# Patient Record
(demographics unavailable — no encounter records)

---

## 2024-10-07 NOTE — ASSESSMENT
[FreeTextEntry1] : 65 year old female presenting for evaluation.  #Cancer associated pain: -Side effects to opiate medication -Continue medical cannabis prn -I Stop # [id number]  #Compression fracture/Neuropathic pain: -S/p Neurosurgery evaluation -TLSO -Continue gabapentin 200mg BID-Rx sent  Close follow up in 6-8 weeks.

## 2024-10-07 NOTE — DATA REVIEWED
[FreeTextEntry1] : 7/3/24- Xray mild compression deformities of T7 and T8.  MRI thoracic spine without contrast 7/19/24- chronic compression fracture above T6. Abnormal marrow signal in T8 vertebral body which could represent a stress fracture. Bulging at T6-7. Cervical disc abnormalities at C4-5, C5-6.  PET/CT 7/24/24- severe compression deformity at T7 with increased uptake, SUV 6.2. Healing rib fracture at the anteromedial right third rib, SUV 3.5. 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

## 2024-10-07 NOTE — PHYSICAL EXAM
[FreeTextEntry1] : Gen: Patient is A&O x 3, NAD HEENT: EOMI, hearing grossly normal Resp: regular, non - labored CV: pulses regular Skin: no rashes, erythema Lymph: no clubbing, cyanosis, edema Inspection: no instability  ROM: full throughout Palpation: No TTP thoracic spine Sensation: intact to light touch Reflexes: 1+ and symmetric throughout Strength: 5/5 throughout Special tests: -Hoffmans sign  Gait: normal, non-antalgic

## 2024-10-07 NOTE — HISTORY OF PRESENT ILLNESS
[FreeTextEntry1] : Ms. Ya is a 65 year old female who was found to have multiple compression fractures, work up with IgG kappa multiple myeloma along with iron deficiency anemia.  History of colonic AVM.  Treatment with Velcade, Revlimid, Dexamethasone Started on 8/9/24.  She reports that she is doing significantly better since last visit.  Started gabapentin and states that this is helping with her pain no major side effects.  Occasionally using medical marijuana for days where she has more pain especially in her back region.  No new weakness or bowel bladder dysfunction.  No side effects current treatment.

## 2024-10-20 NOTE — REVIEW OF SYSTEMS
[Fever] : no fever [Chills] : no chills [Night Sweats] : no night sweats [Shortness Of Breath] : no shortness of breath [SOB on Exertion] : no shortness of breath during exertion [Abdominal Pain] : no abdominal pain [Vomiting] : no vomiting [FreeTextEntry3] : eye redness [FreeTextEntry5] : from ribs - occasional right flank- better [FreeTextEntry9] : Back pain from compression fractures- better [de-identified] : headaches

## 2024-10-20 NOTE — HISTORY OF PRESENT ILLNESS
[de-identified] : IgG kappa multiple myeloma Iron deficiency anemia/colonic AVM  ARI BARRIENTOS is a 65 y.o. 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   Patient reports ~ 6 month hx of intermittent right flank rib pains.  Would notice after certain movements, then would seem to resolve.   Then on 6/29/24 she was playing on the floor with her puppy and developed acute severe upper back pains.  7/2/24 - Saw PCP Dr. Gastelum - Was felt to have muscle spasms but was sent for labs and an x-ray.   WBC: 12.59, Hgb: 7.7, Hct: 31.2, MCV: 73.2, Plts: 500 Ferritin: 4, TSAT: 5% SPEP: M-spike 1.7 SIFE: IgG Kappa monoclonal band present Serum light chains: Kappa: 592, Lambda: 11.2, K/L FLC Ratio: 50.63 No QIG's available, No CMP.   Daughter reports had spine X-ray ([facility]) that showed that showed a T7 and T8 compression fracture. (Daughter is a physical therapist and somewhat familiar with these medical terms). Patient has been taking Percocet, but it is barely just getting the edge off.  She has an appointment with a spine doctor later today.   Patient recall being told she had AVM's about 4 years ago.  Had anemia - was sent to GI for an evaluation (Dr. Mian Jaimes) and she reports she had a colonoscopy where AVM's were seen and cauterized.   States she has had a few subsequent episodes of anemia - would be sent back to GI and have another procedure.  Then she would be instructed to take PO iron for a short period of time after and her anemia would resolve.  She has never had a blood transfusion or IV iron. She denies a personal or family hx of bleeding or clotting disorders.  She reports she just saw Dr. Villanueva 7/9/24 - was instructed to start on PO iron, folic acid, and Vitamin C. Per daughter it sounds like he was considering a procedure but wanted to work around every else.  Patient does admit to chronic fatigue. She reports she has had pica for ice for a very long time.  She denies any breathing issues but her daughter feels she has SOB and wheezing on occasion.    MRI thoracic spine without contrast 7/19/24- chronic compression fracture above T6.  Abnormal marrow signal in T8 vertebral body which could represent a stress fracture.  Bulging at T6-7.  Cervical disc abnormalities at C4-5, C5-6.  PET/CT 7/24/24- severe compression deformity at T7 with increased uptake, SUV 6.2.  Healing rib fracture at the anteromedial right third rib, SUV 3.5.  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  Bone marrow biopsy 7/17/24- plasma cell myeloma, 18% by aspirate.  Trilineage hematopoiesis, no iron stores.  IHC  >10% but fragmented marrow.  Cytogenetics- 51,X,-X,add(2)(p11.2),+3,+5,+5,idic(6)(q21),+7,+9,-13,+15,+19,add(20)(q13.1)[6]/46,XX[14] ABNORMAL FISH MULTIPLE MYELOMA PANEL - RB1 deletion detected (11%)  [de-identified] : She is here with her daughter.  She is feeling much improved.  C3D15 of velcade today. Overall she is feeling well.  Has some erythema of or eyes, seen by the eye doctor, no vision issues.  Also with headaches.  Attributes both to possibly the revlimid.   Pain is minimal. Awaiting to complete her dental work next week.

## 2024-10-20 NOTE — REVIEW OF SYSTEMS
[Fever] : no fever [Chills] : no chills [Night Sweats] : no night sweats [Shortness Of Breath] : no shortness of breath [SOB on Exertion] : no shortness of breath during exertion [Abdominal Pain] : no abdominal pain [Vomiting] : no vomiting [FreeTextEntry3] : eye redness [FreeTextEntry5] : from ribs - occasional right flank- better [FreeTextEntry9] : Back pain from compression fractures- better [de-identified] : headaches

## 2024-10-20 NOTE — HISTORY OF PRESENT ILLNESS
[de-identified] : IgG kappa multiple myeloma Iron deficiency anemia/colonic AVM  ARI BARRIENTOS is a 65 y.o. 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   Patient reports ~ 6 month hx of intermittent right flank rib pains.  Would notice after certain movements, then would seem to resolve.   Then on 6/29/24 she was playing on the floor with her puppy and developed acute severe upper back pains.  7/2/24 - Saw PCP Dr. Gastelum - Was felt to have muscle spasms but was sent for labs and an x-ray.   WBC: 12.59, Hgb: 7.7, Hct: 31.2, MCV: 73.2, Plts: 500 Ferritin: 4, TSAT: 5% SPEP: M-spike 1.7 SIFE: IgG Kappa monoclonal band present Serum light chains: Kappa: 592, Lambda: 11.2, K/L FLC Ratio: 50.63 No QIG's available, No CMP.   Daughter reports had spine X-ray ([facility]) that showed that showed a T7 and T8 compression fracture. (Daughter is a physical therapist and somewhat familiar with these medical terms). Patient has been taking Percocet, but it is barely just getting the edge off.  She has an appointment with a spine doctor later today.   Patient recall being told she had AVM's about 4 years ago.  Had anemia - was sent to GI for an evaluation (Dr. Mian Jaimes) and she reports she had a colonoscopy where AVM's were seen and cauterized.   States she has had a few subsequent episodes of anemia - would be sent back to GI and have another procedure.  Then she would be instructed to take PO iron for a short period of time after and her anemia would resolve.  She has never had a blood transfusion or IV iron. She denies a personal or family hx of bleeding or clotting disorders.  She reports she just saw Dr. Villanueva 7/9/24 - was instructed to start on PO iron, folic acid, and Vitamin C. Per daughter it sounds like he was considering a procedure but wanted to work around every else.  Patient does admit to chronic fatigue. She reports she has had pica for ice for a very long time.  She denies any breathing issues but her daughter feels she has SOB and wheezing on occasion.    MRI thoracic spine without contrast 7/19/24- chronic compression fracture above T6.  Abnormal marrow signal in T8 vertebral body which could represent a stress fracture.  Bulging at T6-7.  Cervical disc abnormalities at C4-5, C5-6.  PET/CT 7/24/24- severe compression deformity at T7 with increased uptake, SUV 6.2.  Healing rib fracture at the anteromedial right third rib, SUV 3.5.  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  Bone marrow biopsy 7/17/24- plasma cell myeloma, 18% by aspirate.  Trilineage hematopoiesis, no iron stores.  IHC  >10% but fragmented marrow.  Cytogenetics- 51,X,-X,add(2)(p11.2),+3,+5,+5,idic(6)(q21),+7,+9,-13,+15,+19,add(20)(q13.1)[6]/46,XX[14] ABNORMAL FISH MULTIPLE MYELOMA PANEL - RB1 deletion detected (11%)  [de-identified] : She is here with her daughter.  She is feeling much improved.  C3D15 of velcade today. Overall she is feeling well.  Has some erythema of or eyes, seen by the eye doctor, no vision issues.  Also with headaches.  Attributes both to possibly the revlimid.   Pain is minimal. Awaiting to complete her dental work next week.

## 2024-10-20 NOTE — ASSESSMENT
[FreeTextEntry1] : Multiple myeloma. ISS-1.  Standard risk.   This is a 65 year old female who was found to have multiple compression fractures, work up with IgG kappa multiple myeloma along with iron deficiency anemia.   7/3/24- Xray mild compression deformities of T7 and T8.   Labs on 7/12/24- Hg 7.7 Creatinine 0.88, calcium 9.2 total protein/albumin 8.5/4.0 SPEP- M spike 2.1, IgG 2789, kappa FLC 54.96/lambda FLC 0.85, ratio 64.66 7/5- Iron 29/sat 5/TIBC 528/ferritin 4  Iron deficiency/history of colonic AVM-  Given 4 doses of feraheme in July. Her Hg has since normalized- Hg 14.8 today.  Work up has confirmed multiple myeloma. ISS-1.  Standard risk.  MRI thoracic spine without contrast 7/19/24- chronic compression fracture above T6. Abnormal marrow signal in T8 vertebral body which could represent a stress fracture. Bulging at T6-7. Cervical disc abnormalities at C4-5, C5-6.  PET/CT 7/24/24- severe compression deformity at T7 with increased uptake, SUV 6.2. Healing rib fracture at the anteromedial right third rib, SUV 3.5. 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  Bone marrow biopsy 7/17/24- plasma cell myeloma, 18% by aspirate. Trilineage hematopoiesis, no iron stores. IHC  >10% but fragmented marrow. Cytogenetics- 51,X,-X,add(2)(p11.2),+3,+5,+5,idic(6)(q21),+7,+9,-13,+15,+19,add(20)(q13.1)[6]/46,XX[14] ABNORMAL FISH MULTIPLE MYELOMA PANEL - RB1 deletion detected (11%).  Started on RVD, bortezomib, lenalidomide, and dexamethasone (VRd). Velcade 1.3mg/m2 SQ weekly x 3, 1 week off.  Revlimid 25mg/day, days 1-21 every 28 days. Dexamethasone 20mg weekly.  Started on 8/9/24.  Currently cycle 3, day 15.   7/12/24- M spike 2.1, IgG 2789, k/l ratio 64.66 9/19/24- M spike 0.3, IgG 654, k/l ratio 1.35 Repeat today.   Dental work planned, hold on xgeva.  Transplant evaluation- planned next week.   Thromboprophylaxis with aspirin daily (taking 325mg).   Antiviral prophylaxis with acyclovir 400mg twice a day.  Zofran as needed for nausea. Bowel regimen for constipation.   Continued pain control with gabapentin/tramadol as needed.   She will follow up in 4 weeks.

## 2024-10-24 NOTE — REASON FOR VISIT
[Other: _____] : [unfilled] [Initial Consultation] : an initial consultation for [FreeTextEntry2] : Multiple myeloma/JUAN LUIS

## 2024-10-24 NOTE — ASSESSMENT
[FreeTextEntry1] : Patient is a 65 year old female diagnosed with IgG kappa multiple myeloma along with iron deficiency anemia.  If Multiple Myeloma:  Type: IgG Kappa  symptomatic vs asymptomatic ISS stage: 1  R-ISS stage: [place] Stage:  Cytogenetic risk:  Patients history, pathology, imaging, and plan was reviewed.  Patient has completed 3 cycles of RVD, with improvement in the m-spike. At this point, the patient appears to have achieved a Disease response, as evidence by PET vs % reduction in M spike vs % reduction in free light chains. This is a BMT Indications for Transplant indication for an autologous stem cell transplant based on the ASTCT practice guidelines. The patient is an excellent candidate for transplant and has great social support. We spoke to the patient at length regarding their disease, treatment options, and the role of autologous HSCT in the treatment of their disease and the expected morbidity and mortality as well as impact on disease-free, overall survival. We discussed stem cell mobilization with filgrastim and plerixafor, tunneled plasmapheresis catheter placement, stem cell collection via apheresis, conditioning chemotherapy Autologous BMT Conditioning Regimen, autologous stem cell transplant hospital admission, expected hospital course, side effects of treatment, discharge medication, post-transplant outpatient follows-up and precautions. The patient verbalized understanding and their questions have been answered.  We encouraged the patient to take the time to read our transplant book. They will be referred to our , for a psycho/social evaluation prior to transplant. They will receive for further education at a later time. We have referred the patient for dental evaluation and clearance.  We recommend completing cycle 6 of RVD and restaging with PET/CT vs bone marrow biopsy . The patient verbalized understanding of plan. We will reach out to their primary hematologist with our recommendations. The patient was encouraged to contact us with any questions or concerns.  Plan to proceed with pretesting.  Stem cell mobilization/collection the week of 11/18/24 Tentative plan to admit for transplant the week of 12/9/24.   Follow-up with Dr. Foss in   Faviola Foss MD [facility]

## 2024-10-24 NOTE — HISTORY OF PRESENT ILLNESS
[de-identified] : IgG kappa multiple myeloma Iron deficiency anemia/colonic AVM  ARI BARRIENTOS is a 65 y.o. 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   Patient reports ~ 6 month hx of intermittent right flank rib pains.  Would notice after certain movements, then would seem to resolve.   Then on 6/29/24 she was playing on the floor with her puppy and developed acute severe upper back pains.  7/2/24 - Saw PCP Dr. Gastelum - Was felt to have muscle spasms but was sent for labs and an x-ray.   WBC: 12.59, Hgb: 7.7, Hct: 31.2, MCV: 73.2, Plts: 500 Ferritin: 4, TSAT: 5% SPEP: M-spike 1.7 SIFE: IgG Kappa monoclonal band present Serum light chains: Kappa: 592, Lambda: 11.2, K/L FLC Ratio: 50.63 No QIG's available, No CMP.   Daughter reports had spine X-ray ([facility]) that showed that showed a T7 and T8 compression fracture. (Daughter is a physical therapist and somewhat familiar with these medical terms). Patient has been taking Percocet, but it is barely just getting the edge off.  She has an appointment with a spine doctor later today.   Patient recall being told she had AVM's about 4 years ago.  Had anemia - was sent to GI for an evaluation (Dr. Mian Jaimes) and she reports she had a colonoscopy where AVM's were seen and cauterized.   States she has had a few subsequent episodes of anemia - would be sent back to GI and have another procedure.  Then she would be instructed to take PO iron for a short period of time after and her anemia would resolve.  She has never had a blood transfusion or IV iron. She denies a personal or family hx of bleeding or clotting disorders.  She reports she just saw Dr. Villanueva 7/9/24 - was instructed to start on PO iron, folic acid, and Vitamin C. Per daughter it sounds like he was considering a procedure but wanted to work around every else.  Patient does admit to chronic fatigue. She reports she has had pica for ice for a very long time.  She denies any breathing issues but her daughter feels she has SOB and wheezing on occasion.    MRI thoracic spine without contrast 7/19/24- chronic compression fracture above T6.  Abnormal marrow signal in T8 vertebral body which could represent a stress fracture.  Bulging at T6-7.  Cervical disc abnormalities at C4-5, C5-6.  PET/CT 7/24/24- severe compression deformity at T7 with increased uptake, SUV 6.2.  Healing rib fracture at the anteromedial right third rib, SUV 3.5.  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  Bone marrow biopsy 7/17/24- plasma cell myeloma, 18% by aspirate.  Trilineage hematopoiesis, no iron stores.  IHC  >10% but fragmented marrow.  Cytogenetics- 51,X,-X,add(2)(p11.2),+3,+5,+5,idic(6)(q21),+7,+9,-13,+15,+19,add(20)(q13.1)[6]/46,XX[14] ABNORMAL FISH MULTIPLE MYELOMA PANEL - RB1 deletion detected (11%)   Patient is a 65 year old female, who presents to discuss the role of an autologous HSCT in the management of their multiple myeloma. I had the pleasure of seeing the patient in consultation, who was referred by Dr oGetz. Patient is accompanied by daughter.   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   ---------Oncologic Hx:------------  Multiple Myeloma   ---------Interval Hx:----------------   ---------Treatment Hx------------- Status post 3 cycles of RVD, bortezomib, lenalidomide, and dexamethasone (VRd). Radiation treatment? specificy location, time and dates.   ---------Pathology Hx------------- Initial Pre-treatment Biopsy (of what): Date ---Morphology: ---IHC stain: ---FISH: ---Karyotype: ---NGS:     XXXXXXX  Biopsy (of what): Date ---Morphology: ---IHC stain: ---FISH: ---Karyotype: ---NGS:    ---------Imaging Hx---------------- (if applicable)   ---------Social:----------------------- -The patient was born in *** and currently lives in ***. - Patient lives with her daughter and daughter's fiance. - Patient has ___ siblings. 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  -  XXsmoker, XX alcohol use, XX other drug use   --------------------Surgical Hx: ------------------------     --------------------Past Family Hx:----------------------                       [de-identified] : 10.24.24: Patient is here for a initial consult for an Autologous Stem Cell Transplant.

## 2024-10-24 NOTE — ADDENDUM
[FreeTextEntry1] : Documented by Karina Avendano acting as a scribe for Dr. Nory Foss on 10/24/24. All medical record entries made by the Scribe were at my, Dr. Nory Foss, direction and personally dictated by me on 10/24/24. I have reviewed the chart and agree that the record accurately reflects my personal performance of the history, physical exam, assessment and plan. I have also personally directed, reviewed, and agree with the discharge instructions.

## 2024-11-14 NOTE — PHYSICAL EXAM
[Obese] : obese [Normal] : affect appropriate [de-identified] : anicteric [de-identified] : no c/c/e [de-identified] : no rashes

## 2024-11-14 NOTE — REVIEW OF SYSTEMS
[Patient Intake Form Reviewed] : Patient intake form was reviewed [Fever] : no fever [Chills] : no chills [Night Sweats] : no night sweats [Fatigue] : fatigue [Chest Pain] : chest pain [Shortness Of Breath] : no shortness of breath [SOB on Exertion] : no shortness of breath during exertion [Abdominal Pain] : no abdominal pain [Vomiting] : no vomiting [Constipation] : constipation [Diarrhea: Grade 0] : Diarrhea: Grade 0 [Muscle Pain] : muscle pain [Anxiety] : anxiety [Negative] : Heme/Lymph [FreeTextEntry3] : eye redness [FreeTextEntry5] : from ribs - occasional right flank- better [FreeTextEntry9] : Back pain from compression fractures- better [de-identified] : headaches

## 2024-11-14 NOTE — HISTORY OF PRESENT ILLNESS
[de-identified] : IgG kappa multiple myeloma Iron deficiency anemia/colonic AVM  ARI BARRIENTOS is a 65 y.o. 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   Patient reports ~ 6 month hx of intermittent right flank rib pains.  Would notice after certain movements, then would seem to resolve.   Then on 6/29/24 she was playing on the floor with her puppy and developed acute severe upper back pains.  7/2/24 - Saw PCP Dr. Gastelum - Was felt to have muscle spasms but was sent for labs and an x-ray.   WBC: 12.59, Hgb: 7.7, Hct: 31.2, MCV: 73.2, Plts: 500 Ferritin: 4, TSAT: 5% SPEP: M-spike 1.7 SIFE: IgG Kappa monoclonal band present Serum light chains: Kappa: 592, Lambda: 11.2, K/L FLC Ratio: 50.63 No QIG's available, No CMP.   Daughter reports had spine X-ray ([facility]) that showed that showed a T7 and T8 compression fracture. (Daughter is a physical therapist and somewhat familiar with these medical terms). Patient has been taking Percocet, but it is barely just getting the edge off.  She has an appointment with a spine doctor later today.   Patient recall being told she had AVM's about 4 years ago.  Had anemia - was sent to GI for an evaluation (Dr. Mian Jaimes) and she reports she had a colonoscopy where AVM's were seen and cauterized.   States she has had a few subsequent episodes of anemia - would be sent back to GI and have another procedure.  Then she would be instructed to take PO iron for a short period of time after and her anemia would resolve.  She has never had a blood transfusion or IV iron. She denies a personal or family hx of bleeding or clotting disorders.  She reports she just saw Dr. Villanueva 7/9/24 - was instructed to start on PO iron, folic acid, and Vitamin C. Per daughter it sounds like he was considering a procedure but wanted to work around every else.  Patient does admit to chronic fatigue. She reports she has had pica for ice for a very long time.  She denies any breathing issues but her daughter feels she has SOB and wheezing on occasion.    MRI thoracic spine without contrast 7/19/24- chronic compression fracture above T6.  Abnormal marrow signal in T8 vertebral body which could represent a stress fracture.  Bulging at T6-7.  Cervical disc abnormalities at C4-5, C5-6.  PET/CT 7/24/24- severe compression deformity at T7 with increased uptake, SUV 6.2.  Healing rib fracture at the anteromedial right third rib, SUV 3.5.  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  Bone marrow biopsy 7/17/24- plasma cell myeloma, 18% by aspirate.  Trilineage hematopoiesis, no iron stores.  IHC  >10% but fragmented marrow.  Cytogenetics- 51,X,-X,add(2)(p11.2),+3,+5,+5,idic(6)(q21),+7,+9,-13,+15,+19,add(20)(q13.1)[6]/46,XX[14] ABNORMAL FISH MULTIPLE MYELOMA PANEL - RB1 deletion detected (11%)  [de-identified] : She is here with her daughter.  She is feeling much improved.  C3D15 of velcade today. Overall she is feeling well.  Has some erythema of or eyes, seen by the eye doctor, no vision issues.  Also with headaches.  Attributes both to possibly the revlimid.   Pain is minimal. Awaiting to complete her dental work next week.

## 2024-11-14 NOTE — REASON FOR VISIT
[Follow-Up Visit] : a follow-up visit for [Other: _____] : [unfilled] [FreeTextEntry2] : Multiple myeloma/JUAN LUIS

## 2024-11-19 NOTE — PROCEDURE
[Bone Marrow Biopsy] : bone marrow biopsy [Bone Marrow Aspiration] : bone marrow aspiration  [Patient] : the patient [Verbal Consent Obtained] : verbal consent was obtained prior to the procedure [Patient identification verified] : patient identification verified [Procedure verified and consent obtained] : procedure verified and consent obtained [Laterality verified and correct site marked] : laterality verified and correct site marked [Right] : site: right [Correct positioning] : correct positioning [Prone] : prone [Superior iliac spine was identified] : the superior iliac spine was identified. [The right posterior iliac crest was prepped with betadine and draped, using sterile technique.] : The right posterior iliac crest was prepped with betadine and draped, using sterile technique. [Lidocaine was injected and into the periosteum overlying the site.] : Lidocaine was injected and into the periosteum overlying the site. [Aspirate] : aspirate [Cytogenetics] : cytogenetics [FISH] : FISH [Biopsy] : biopsy [Flow Cytometry] : flow cytometry [] : The patient was instructed to remove the bandage the following AM. The patient may bathe. Acetaminophen may be taken for discomfort, as per package directions.If there are any other problems, the patient was instructed to call the office. The patient verbalized understanding, and is aware of the office contact numbers. [FreeTextEntry1] : Multiple Myeloma pre transplant [FreeTextEntry2] : 6 cc of lidocaine was used for the procedure.   WBC: 6.10 Hgb: 14.4 Hct: 43.3 Plts: 141  Bone marrow aspiration and biopsy were done. Multiple Myeloma panel requested.

## 2024-11-19 NOTE — REASON FOR VISIT
[Bone Marrow Biopsy] : bone marrow biopsy [Bone Marrow Aspiration] : bone marrow aspiration [FreeTextEntry2] : Multiple Myeloma pre transplant

## 2024-11-22 NOTE — HISTORY OF PRESENT ILLNESS
[90: Able to carry normal activity; minor signs or symptoms of disease.] : 90: Able to carry normal activity; minor signs or symptoms of disease.  [de-identified] : IgG kappa multiple myeloma Iron deficiency anemia/colonic AVM  ARI BARRIENTOS is a 65 y.o. 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  PRIOR HISTORY Patient reports ~ 6 month hx of intermittent right flank rib pains.  Would notice after certain movements, then would seem to resolve.   Then on 6/29/24 she was playing on the floor with her puppy and developed acute severe upper back pains.  7/2/24 - Saw PCP Dr. Gastelum - Was felt to have muscle spasms but was sent for labs and an x-ray.    upon dx WBC: 12.59, Hgb: 7.7, Hct: 31.2, MCV: 73.2, Plts: 500 Ferritin: 4, TSAT: 5% SPEP: M-spike 1.7 SIFE: IgG Kappa monoclonal band present Serum light chains: Kappa: 592, Lambda: 11.2, K/L FLC Ratio: 50.63 No QIG's available, No CMP.   Daughter reports had spine X-ray ([facility]) that showed that showed a T7 and T8 compression fracture. (Daughter is a physical therapist and somewhat familiar with these medical terms).   Patient recall being told she had AVM's about 4 years ago.  Had anemia - was sent to GI for an evaluation (Dr. Mian Jaimes) and she reports she had a colonoscopy where AVM's were seen and cauterized.   States she has had a few subsequent episodes of anemia - would be sent back to GI and have another procedure.  Then she would be instructed to take PO iron for a short period of time after and her anemia would resolve.  She has never had a blood transfusion or IV iron. She denies a personal or family hx of bleeding or clotting disorders.  She reports she just saw Dr. Villanueva 7/9/24 - was instructed to start on PO iron, folic acid, and Vitamin C. Per daughter it sounds like he was considering a procedure but wanted to work around every else.  Patient does admit to chronic fatigue. She reports she has had pica for ice for a very long time.  She denies any breathing issues but her daughter feels she has SOB and wheezing on occasion.    MRI thoracic spine without contrast 7/19/24- chronic compression fracture above T6.  Abnormal marrow signal in T8 vertebral body which could represent a stress fracture.  Bulging at T6-7.  Cervical disc abnormalities at C4-5, C5-6.  PET/CT 7/24/24- severe compression deformity at T7 with increased uptake, SUV 6.2.  Healing rib fracture at the anteromedial right third rib, SUV 3.5.  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  Bone marrow biopsy 7/17/24- plasma cell myeloma, 18% by aspirate.  Trilineage hematopoiesis, no iron stores.  IHC  >10% but fragmented marrow.  Cytogenetics- 51,X,-X,add(2)(p11.2),+3,+5,+5,idic(6)(q21),+7,+9,-13,+15,+19,add(20)(q13.1)[6]/46,XX[14] ABNORMAL FISH MULTIPLE MYELOMA PANEL - RB1 deletion detected (11%)   Patient is a 65 year old female, who presents to discuss the role of an autologous HSCT in the management of their multiple myeloma. I had the pleasure of seeing the patient in consultation, who was referred by Dr Goetz. Patient is accompanied by daughter.   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   ---------Oncologic Hx:------------  Multiple Myeloma IgG kappa   ---------Interval Hx:----------------   ---------Treatment Hx------------- Status post 3 cycles of RVD, bortezomib, lenalidomide, and dexamethasone (VRd). Radiation treatment no specificy location, time and dates.   ---------Pathology Hx------------- Initial Pre-treatment Biopsy BM: Date 7/17/24 ---Morphology: above ---IHC stain: ---FISH: rb1 del ---Karyotype: complex as above ---NGS:     XXXXXXX  Biopsy (of what): Date ---Morphology: ---IHC stain: ---FISH: ---Karyotype: ---NGS:    ---------Imaging Hx---------------- above   ---------Social:----------------------- -The patient was born on [place] and currently lives here - Patient lives with her daughter and daughter's fiance. - Patient has older siblings. 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  -  no smoker, no alcohol use, no other drug use   --------------------Surgical Hx: ------------------------     --------------------Past Family Hx:----------------------                       [de-identified] : 11/19/24: Patient is here for a follow up visit to discuss autologous stem cell transplant. Completed cycle 4 RVD. Bone marrow biopsy today. Overall, well and offers no acute concerns.

## 2024-11-22 NOTE — HISTORY OF PRESENT ILLNESS
[90: Able to carry normal activity; minor signs or symptoms of disease.] : 90: Able to carry normal activity; minor signs or symptoms of disease.  [de-identified] : IgG kappa multiple myeloma Iron deficiency anemia/colonic AVM  ARI BARRIENTOS is a 65 y.o. 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  PRIOR HISTORY Patient reports ~ 6 month hx of intermittent right flank rib pains.  Would notice after certain movements, then would seem to resolve.   Then on 6/29/24 she was playing on the floor with her puppy and developed acute severe upper back pains.  7/2/24 - Saw PCP Dr. Gastelum - Was felt to have muscle spasms but was sent for labs and an x-ray.    upon dx WBC: 12.59, Hgb: 7.7, Hct: 31.2, MCV: 73.2, Plts: 500 Ferritin: 4, TSAT: 5% SPEP: M-spike 1.7 SIFE: IgG Kappa monoclonal band present Serum light chains: Kappa: 592, Lambda: 11.2, K/L FLC Ratio: 50.63 No QIG's available, No CMP.   Daughter reports had spine X-ray ([facility]) that showed that showed a T7 and T8 compression fracture. (Daughter is a physical therapist and somewhat familiar with these medical terms).   Patient recall being told she had AVM's about 4 years ago.  Had anemia - was sent to GI for an evaluation (Dr. Mian Jaimes) and she reports she had a colonoscopy where AVM's were seen and cauterized.   States she has had a few subsequent episodes of anemia - would be sent back to GI and have another procedure.  Then she would be instructed to take PO iron for a short period of time after and her anemia would resolve.  She has never had a blood transfusion or IV iron. She denies a personal or family hx of bleeding or clotting disorders.  She reports she just saw Dr. Villanueva 7/9/24 - was instructed to start on PO iron, folic acid, and Vitamin C. Per daughter it sounds like he was considering a procedure but wanted to work around every else.  Patient does admit to chronic fatigue. She reports she has had pica for ice for a very long time.  She denies any breathing issues but her daughter feels she has SOB and wheezing on occasion.    MRI thoracic spine without contrast 7/19/24- chronic compression fracture above T6.  Abnormal marrow signal in T8 vertebral body which could represent a stress fracture.  Bulging at T6-7.  Cervical disc abnormalities at C4-5, C5-6.  PET/CT 7/24/24- severe compression deformity at T7 with increased uptake, SUV 6.2.  Healing rib fracture at the anteromedial right third rib, SUV 3.5.  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  Bone marrow biopsy 7/17/24- plasma cell myeloma, 18% by aspirate.  Trilineage hematopoiesis, no iron stores.  IHC  >10% but fragmented marrow.  Cytogenetics- 51,X,-X,add(2)(p11.2),+3,+5,+5,idic(6)(q21),+7,+9,-13,+15,+19,add(20)(q13.1)[6]/46,XX[14] ABNORMAL FISH MULTIPLE MYELOMA PANEL - RB1 deletion detected (11%)   Patient is a 65 year old female, who presents to discuss the role of an autologous HSCT in the management of their multiple myeloma. I had the pleasure of seeing the patient in consultation, who was referred by Dr Goetz. Patient is accompanied by daughter.   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   ---------Oncologic Hx:------------  Multiple Myeloma IgG kappa   ---------Interval Hx:----------------   ---------Treatment Hx------------- Status post 3 cycles of RVD, bortezomib, lenalidomide, and dexamethasone (VRd). Radiation treatment no specificy location, time and dates.   ---------Pathology Hx------------- Initial Pre-treatment Biopsy BM: Date 7/17/24 ---Morphology: above ---IHC stain: ---FISH: rb1 del ---Karyotype: complex as above ---NGS:     XXXXXXX  Biopsy (of what): Date ---Morphology: ---IHC stain: ---FISH: ---Karyotype: ---NGS:    ---------Imaging Hx---------------- above   ---------Social:----------------------- -The patient was born on [place] and currently lives here - Patient lives with her daughter and daughter's fiance. - Patient has older siblings. 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  -  no smoker, no alcohol use, no other drug use   --------------------Surgical Hx: ------------------------     --------------------Past Family Hx:----------------------                       [de-identified] : 11/19/24: Patient is here for a follow up visit to discuss autologous stem cell transplant. Completed cycle 4 RVD. Bone marrow biopsy today. Overall, well and offers no acute concerns.

## 2024-11-22 NOTE — ASSESSMENT
[FreeTextEntry1] : Patient is a 65 year old female diagnosed with IgG kappa multiple myeloma along with iron deficiency anemia, CAD, colonic avm, htn, hld here for a follow up visit to discuss autologous stem cell transplantation.   If Multiple Myeloma:  Type: IgG Kappa  symptomatic  ISS stage: 1  R-ISS stage: Durie Waverly Stage:  Cytogenetic risk: high  Patient's history, pathology, imaging, and plan was reviewed.  Patient has completed 4 cycles of RVD, with improvement in the m-spike. At this point, the patient appears to have achieved a Disease response, as evidence by reduction in M spike. This is a BMT Indication for an autologous stem cell transplant based on the ASTCT practice guidelines. The patient is an excellent candidate for transplant and has great social support. We spoke to the patient at length regarding their disease, treatment options, and the role of autologous HSCT in the treatment of their disease and the expected morbidity and mortality as well as impact on disease-free, overall survival. We discussed stem cell mobilization with filgrastim and plerixafor, tunneled plasmapheresis catheter placement, stem cell collection via apheresis, conditioning chemotherapy Autologous BMT Conditioning Regimen, autologous stem cell transplant hospital admission, expected hospital course, side effects of treatment, discharge medication, post-transplant outpatient follows-up and precautions. The patient verbalized understanding and their questions have been answered.  We encouraged the patient to take the time to read our transplant book. They will be referred to our , for a psycho/social evaluation prior to transplant. They will receive for further education at a later time. We have referred the patient for dental evaluation and clearance.  We recommended completing cycle 4 of RVD and restaging with bone marrow biopsy. Bone marrow biopsy completed on 11/19/24.  The patient verbalized understanding of plan. We will reach out to their primary hematologist with our recommendations. The patient was encouraged to contact us with any questions or concerns. pt has indeterminant guanteferon gold...to see ID also hx of MI will ask for cardiology clearance HEB b core antibody and surface antibody positive...help b PCR pending Pretesting on 11/15/24. .  Stem cell mobilization/collection will begin on 11/29/24. Growth factor teaching on 11/29/24. Shiley catheter will be scheduled on 12/2/24.  Plan to begin stem cell collection on 12/3/24.  Admit for transplant on 12/26/24 at [facility]. or week prior d/w Dr Goetz Restart blood pressure medication.  Follow up on 11/29/24 for growth factor teaching. Follow-up with Dr. Foss in 3 weeks  Faviola Foss MD [facility] Adult Transplantation and Cellular Therapy Program

## 2024-11-22 NOTE — ASSESSMENT
[FreeTextEntry1] : Patient is a 65 year old female diagnosed with IgG kappa multiple myeloma along with iron deficiency anemia, CAD, colonic avm, htn, hld here for a follow up visit to discuss autologous stem cell transplantation.   If Multiple Myeloma:  Type: IgG Kappa  symptomatic  ISS stage: 1  R-ISS stage: Durie Breda Stage:  Cytogenetic risk: high  Patient's history, pathology, imaging, and plan was reviewed.  Patient has completed 4 cycles of RVD, with improvement in the m-spike. At this point, the patient appears to have achieved a Disease response, as evidence by reduction in M spike. This is a BMT Indication for an autologous stem cell transplant based on the ASTCT practice guidelines. The patient is an excellent candidate for transplant and has great social support. We spoke to the patient at length regarding their disease, treatment options, and the role of autologous HSCT in the treatment of their disease and the expected morbidity and mortality as well as impact on disease-free, overall survival. We discussed stem cell mobilization with filgrastim and plerixafor, tunneled plasmapheresis catheter placement, stem cell collection via apheresis, conditioning chemotherapy Autologous BMT Conditioning Regimen, autologous stem cell transplant hospital admission, expected hospital course, side effects of treatment, discharge medication, post-transplant outpatient follows-up and precautions. The patient verbalized understanding and their questions have been answered.  We encouraged the patient to take the time to read our transplant book. They will be referred to our , for a psycho/social evaluation prior to transplant. They will receive for further education at a later time. We have referred the patient for dental evaluation and clearance.  We recommended completing cycle 4 of RVD and restaging with bone marrow biopsy. Bone marrow biopsy completed on 11/19/24.  The patient verbalized understanding of plan. We will reach out to their primary hematologist with our recommendations. The patient was encouraged to contact us with any questions or concerns. pt has indeterminant guanteferon gold...to see ID also hx of MI will ask for cardiology clearance HEB b core antibody and surface antibody positive...help b PCR pending Pretesting on 11/15/24. .  Stem cell mobilization/collection will begin on 11/29/24. Growth factor teaching on 11/29/24. Shiley catheter will be scheduled on 12/2/24.  Plan to begin stem cell collection on 12/3/24.  Admit for transplant on 12/26/24 at [facility]. or week prior d/w Dr Goetz Restart blood pressure medication.  Follow up on 11/29/24 for growth factor teaching. Follow-up with Dr. Foss in 3 weeks  Faviola Foss MD [facility] Adult Transplantation and Cellular Therapy Program

## 2024-12-04 NOTE — PHYSICAL EXAM
[General Appearance - Alert] : alert [General Appearance - In No Acute Distress] : in no acute distress [Sclera] : the sclera and conjunctiva were normal [Outer Ear] : the ears and nose were normal in appearance [Oropharynx] : the oropharynx was normal with no thrush [Neck Appearance] : the appearance of the neck was normal [Neck Cervical Mass (___cm)] : no neck mass was observed [Jugular Venous Distention Increased] : there was no jugular-venous distention [Auscultation Breath Sounds / Voice Sounds] : lungs were clear to auscultation bilaterally [Heart Rate And Rhythm] : heart rate was normal and rhythm regular [Heart Sounds] : normal S1 and S2 [Heart Sounds Gallop] : no gallops [Murmurs] : no murmurs [Heart Sounds Pericardial Friction Rub] : no pericardial rub [Full Pulse] : the pedal pulses are present [Edema] : there was no peripheral edema [Bowel Sounds] : normal bowel sounds [Abdomen Soft] : soft [Abdomen Tenderness] : non-tender [Abdomen Mass (___ Cm)] : no abdominal mass palpated [No Palpable Adenopathy] : no palpable adenopathy [Musculoskeletal - Swelling] : no joint swelling [Nail Clubbing] : no clubbing  or cyanosis of the fingernails [Motor Tone] : muscle strength and tone were normal [Skin Color & Pigmentation] : normal skin color and pigmentation [] : no rash [Affect] : the affect was normal

## 2024-12-04 NOTE — HISTORY OF PRESENT ILLNESS
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  Patient denies any chills fevers or night sweats.  Patient denies any night sweats.  Patient denies any cough or shortness of breath.  Denies any nausea vomiting diarrhea. [] : No [de-identified] : [place] [de-identified] : Travel only to the [place] [de-identified] : Lives permanently [place].  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 No other unusual hobbies. No livestock exposure.  [de-identified] : Urosepsis ~1 year ago. No history of recurrent UTI Sinusitis x1 No Episodes of PNA COVID19 x1 in 2020

## 2024-12-04 NOTE — ASSESSMENT
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   COVID19 Nucleocapsid Antibody PENDING COVID19 Jerry Antibody PENDING HAV IgG PENDING HBVs Ab Negative HBVsAg Negative HBVc Ab Positive HCV Ab PENDING HSV 1 IgG Positive HSV 2 IgG Negative EBV IgG Positive CMV IgG PENDING VZV IgG Positive Measles IgG Positive Mumps IgG PENDING Rubella IgG Positive Quantiferon Gold Indeterminate, TSPOT TB Negative TSPOT TB Negative  HIV Ag/Ab by [facility]IA PENDING Syphilis Screen PENDING Toxoplasma IgG PENDING Strongyloides Ab Negative  PET CT (7/24/24) No adenopathy, nodules or pulmonary infiltrates  #Prestem cell transplant evaluation -QuantiFERON indeterminate however T-SPOT.TB was negative and PET scan without worrisome infiltrates, nodules or adenopathy.  This would not service contraindication for transplant -ID clearance for stem cell transplant pending HIV testing, syphilis testing -Will check HIV antigen antibody, mumps IgG, toxoplasma IgG, hepatitis A IgG, CMV IgG, syphilis screen, Coccidioides antibody  #Encounter to Vaccinate Patient COVID19: Would benefit from COVID19 6241-9254 Vaccine Dose Influenza: Will require RSV: Will require Pneumococcal: Would benefit from PCV20 HAV: Check HAV IgG HBV: Immune, would not require further vaccination MMR: Immune, would not require further vaccination Varicella: Immune, would not require further vaccination Shingles: Will require Shingrix Tdap: Will require Tdap

## 2024-12-04 NOTE — HISTORY OF PRESENT ILLNESS
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  Patient denies any chills fevers or night sweats.  Patient denies any night sweats.  Patient denies any cough or shortness of breath.  Denies any nausea vomiting diarrhea. [] : No [de-identified] : [place] [de-identified] : Travel only to the [place] [de-identified] : Lives permanently [place].  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 No other unusual hobbies. No livestock exposure.  [de-identified] : Urosepsis ~1 year ago. No history of recurrent UTI Sinusitis x1 No Episodes of PNA COVID19 x1 in 2020

## 2024-12-04 NOTE — ASSESSMENT
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   COVID19 Nucleocapsid Antibody PENDING COVID19 Jerry Antibody PENDING HAV IgG PENDING HBVs Ab Negative HBVsAg Negative HBVc Ab Positive HCV Ab PENDING HSV 1 IgG Positive HSV 2 IgG Negative EBV IgG Positive CMV IgG PENDING VZV IgG Positive Measles IgG Positive Mumps IgG PENDING Rubella IgG Positive Quantiferon Gold Indeterminate, TSPOT TB Negative TSPOT TB Negative  HIV Ag/Ab by [facility]IA PENDING Syphilis Screen PENDING Toxoplasma IgG PENDING Strongyloides Ab Negative  PET CT (7/24/24) No adenopathy, nodules or pulmonary infiltrates  #Prestem cell transplant evaluation -QuantiFERON indeterminate however T-SPOT.TB was negative and PET scan without worrisome infiltrates, nodules or adenopathy.  This would not service contraindication for transplant -ID clearance for stem cell transplant pending HIV testing, syphilis testing -Will check HIV antigen antibody, mumps IgG, toxoplasma IgG, hepatitis A IgG, CMV IgG, syphilis screen, Coccidioides antibody  #Encounter to Vaccinate Patient COVID19: Would benefit from COVID19 [id number] Vaccine Dose Influenza: Will require RSV: Will require Pneumococcal: Would benefit from PCV20 HAV: Check HAV IgG HBV: Immune, would not require further vaccination MMR: Immune, would not require further vaccination Varicella: Immune, would not require further vaccination Shingles: Will require Shingrix Tdap: Will require Tdap

## 2024-12-12 NOTE — ASSESSMENT
[FreeTextEntry1] : Multiple myeloma. ISS-1.  Standard risk.   This is a 65 year old female who was found to have multiple compression fractures, work up with IgG kappa multiple myeloma along with iron deficiency anemia.   7/3/24- Xray mild compression deformities of T7 and T8.   Labs on 7/12/24- Hg 7.7 Creatinine 0.88, calcium 9.2 total protein/albumin 8.5/4.0 SPEP- M spike 2.1, IgG 2789, kappa FLC 54.96/lambda FLC 0.85, ratio 64.66 7/5- Iron 29/sat 5/TIBC 528/ferritin 4  Iron deficiency/history of colonic AVM-  Given 4 doses of feraheme in July. Her Hg has since normalized.  Work up has confirmed multiple myeloma. ISS-1.  Standard risk.  MRI thoracic spine without contrast 7/19/24- chronic compression fracture above T6. Abnormal marrow signal in T8 vertebral body which could represent a stress fracture. Bulging at T6-7. Cervical disc abnormalities at C4-5, C5-6.  PET/CT 7/24/24- severe compression deformity at T7 with increased uptake, SUV 6.2. Healing rib fracture at the anteromedial right third rib, SUV 3.5. 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  Bone marrow biopsy 7/17/24- plasma cell myeloma, 18% by aspirate. Trilineage hematopoiesis, no iron stores. IHC  >10% but fragmented marrow. Cytogenetics- 51,X,-X,add(2)(p11.2),+3,+5,+5,idic(6)(q21),+7,+9,-13,+15,+19,add(20)(q13.1)[6]/46,XX[14] ABNORMAL FISH MULTIPLE MYELOMA PANEL - RB1 deletion detected (11%).  Started on RVD, bortezomib, lenalidomide, and dexamethasone (VRd). Velcade 1.3mg/m2 SQ weekly x 3, 1 week off.  Revlimid 25mg/day, days 1-21 every 28 days. Dexamethasone 20mg weekly.  Started on 8/9/24.  Currently cycle 4, day 15.   7/12/24- M spike 2.1, IgG 2789, k/l ratio 64.66 9/19/24- M spike 0.3, IgG 654, k/l ratio 1.35 10/17/24- M spike 0.1, IgG 590, k/l ratio 1.45 Repeat today.   Thromboprophylaxis with aspirin daily (taking 325mg).   Antiviral prophylaxis with acyclovir 400mg twice a day.  Zofran as needed for nausea. Bowel regimen for constipation.  Continued pain control with gabapentin/tramadol as needed.  Dental work planned, hold on xgeva.  In VGPR- PSCT planned- bone marrow biopsy at [facility] next week.  Moblization of stem cells planned for 11/25.   She will follow up post transplant.

## 2024-12-12 NOTE — PHYSICAL EXAM
[Obese] : obese [Normal] : affect appropriate [de-identified] : anicteric [de-identified] : no c/c/e [de-identified] : no rashes

## 2024-12-12 NOTE — HISTORY OF PRESENT ILLNESS
[de-identified] : IgG kappa multiple myeloma Iron deficiency anemia/colonic AVM  ARI BARRIENTOS is a 65 y.o. 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   Patient reports ~ 6 month hx of intermittent right flank rib pains.  Would notice after certain movements, then would seem to resolve.   Then on 6/29/24 she was playing on the floor with her puppy and developed acute severe upper back pains.  7/2/24 - Saw PCP Dr. Gastelum - Was felt to have muscle spasms but was sent for labs and an x-ray.   WBC: 12.59, Hgb: 7.7, Hct: 31.2, MCV: 73.2, Plts: 500 Ferritin: 4, TSAT: 5% SPEP: M-spike 1.7 SIFE: IgG Kappa monoclonal band present Serum light chains: Kappa: 592, Lambda: 11.2, K/L FLC Ratio: 50.63 No QIG's available, No CMP.   Daughter reports had spine X-ray ([facility]) that showed that showed a T7 and T8 compression fracture. (Daughter is a physical therapist and somewhat familiar with these medical terms). Patient has been taking Percocet, but it is barely just getting the edge off.  She has an appointment with a spine doctor later today.   Patient recall being told she had AVM's about 4 years ago.  Had anemia - was sent to GI for an evaluation (Dr. Mian Jaimes) and she reports she had a colonoscopy where AVM's were seen and cauterized.   States she has had a few subsequent episodes of anemia - would be sent back to GI and have another procedure.  Then she would be instructed to take PO iron for a short period of time after and her anemia would resolve.  She has never had a blood transfusion or IV iron. She denies a personal or family hx of bleeding or clotting disorders.  She reports she just saw Dr. Villanueva 7/9/24 - was instructed to start on PO iron, folic acid, and Vitamin C. Per daughter it sounds like he was considering a procedure but wanted to work around every else.  Patient does admit to chronic fatigue. She reports she has had pica for ice for a very long time.  She denies any breathing issues but her daughter feels she has SOB and wheezing on occasion.    MRI thoracic spine without contrast 7/19/24- chronic compression fracture above T6.  Abnormal marrow signal in T8 vertebral body which could represent a stress fracture.  Bulging at T6-7.  Cervical disc abnormalities at C4-5, C5-6.  PET/CT 7/24/24- severe compression deformity at T7 with increased uptake, SUV 6.2.  Healing rib fracture at the anteromedial right third rib, SUV 3.5.  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  Bone marrow biopsy 7/17/24- plasma cell myeloma, 18% by aspirate.  Trilineage hematopoiesis, no iron stores.  IHC  >10% but fragmented marrow.  Cytogenetics- 51,X,-X,add(2)(p11.2),+3,+5,+5,idic(6)(q21),+7,+9,-13,+15,+19,add(20)(q13.1)[6]/46,XX[14] ABNORMAL FISH MULTIPLE MYELOMA PANEL - RB1 deletion detected (11%)  [de-identified] : She had her stem cell mobilized, planned for admission on 12/23 or 12/26 for auto PSCT.  Still has some occasional back discomfort, not taking any pain medications.

## 2024-12-12 NOTE — REVIEW OF SYSTEMS
[Patient Intake Form Reviewed] : Patient intake form was reviewed [Fatigue] : fatigue [Chest Pain] : chest pain [Constipation] : constipation [Diarrhea: Grade 0] : Diarrhea: Grade 0 [Muscle Pain] : muscle pain [Anxiety] : anxiety [Negative] : Heme/Lymph [Fever] : no fever [Chills] : no chills [Night Sweats] : no night sweats [Shortness Of Breath] : no shortness of breath [SOB on Exertion] : no shortness of breath during exertion [Abdominal Pain] : no abdominal pain [Vomiting] : no vomiting [FreeTextEntry3] : eye redness [FreeTextEntry5] : from ribs - occasional right flank- better [FreeTextEntry9] : Back pain from compression fractures- better [de-identified] : headaches

## 2024-12-17 NOTE — ASSESSMENT
[FreeTextEntry1] : Patient is a 65 year old female diagnosed with IgG kappa multiple myeloma along with iron deficiency anemia, CAD, colonic avm, htn, hld here for a follow up visit to discuss autologous stem cell transplantation.  s/p stem cell mobilization and collection with good yield Multiple Myeloma:  Type: IgG Kappa  symptomatic  ISS stage: 1  R-ISS stage: Durie Clawson Stage:  Cytogenetic risk: high  Patient's history, pathology, imaging, and plan was reviewed.  Patient has completed 4 cycles of RVD, with improvement in the m-spike. At this point, the patient appears to have achieved at lease vgpr  Disease response, as evidence by reduction in M spike. This is a BMT Indication for an autologous stem cell transplant based on the ASTCT practice guidelines. The patient is an excellent candidate for transplant and has great social support. We spoke to the patient at length regarding their disease, treatment options, and the role of autologous HSCT in the treatment of their disease and the expected morbidity and mortality as well as impact on disease-free, overall survival. We discussed stem cell mobilization with filgrastim and plerixafor, tunneled plasmapheresis catheter placement, stem cell collection via apheresis, conditioning chemotherapy Autologous BMT Conditioning Regimen, autologous stem cell transplant hospital admission, expected hospital course, side effects of treatment, discharge medication, post-transplant outpatient follows-up and precautions. The patient verbalized understanding and their questions have been answered. Renetta 200  We encouraged the patient to take the time to read our transplant book. They will be referred to our , for a psycho/social evaluation prior to transplant. They will receive for further education at a later time. We have referred the patient for dental evaluation and clearance.  We recommended completing cycle 4 of RVD and restaging with bone marrow biopsy. Bone marrow biopsy completed on 11/19/24 w/o morphologic evidence of plasma cells.  The patient verbalized understanding of plan. We will reach out to their primary hematologist with our recommendations. The patient was encouraged to contact us with any questions or concerns. pt has indeterminant guanteferon gold...to see ID..cleared to proceed also hx of MI will ask for cardiology clearance..done HEB b core antibody and surface antibody positive...help b PCR pending Pretesting on 11/15/24. .  Stem cell mobilization/collection will begin on 11/29/24. Growth factor teaching on 11/29/24. Shiley catheter will be scheduled on 12/2/24.  Plan to begin stem cell collection on 12/3/24.  Admit for transplant on 12/26/24 at [facility]. Infusion 12/27 d/w Dr Goetz Restart blood pressure medication.  cath care 12/24 Follow-up with Dr. Foss in 3 weeks after d/c DRI intermediate hct ci 1  Faviola Foss MD [facility] Adult Transplantation and Cellular Therapy Program

## 2024-12-17 NOTE — PHYSICAL EXAM
[Restricted in physically strenuous activity but ambulatory and able to carry out work of a light or sedentary nature] : Status 1- Restricted in physically strenuous activity but ambulatory and able to carry out work of a light or sedentary nature, e.g., light house work, office work [Normal] : affect appropriate [de-identified] : no active dental infection

## 2024-12-17 NOTE — HISTORY OF PRESENT ILLNESS
[90: Able to carry normal activity; minor signs or symptoms of disease.] : 90: Able to carry normal activity; minor signs or symptoms of disease.  [de-identified] : IgG kappa multiple myeloma Iron deficiency anemia/colonic AVM  ARI BARRIENTOS is a 65 y.o. 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  PRIOR HISTORY Patient reports ~ 6 month hx of intermittent right flank rib pains.  Would notice after certain movements, then would seem to resolve.   Then on 6/29/24 she was playing on the floor with her puppy and developed acute severe upper back pains.  7/2/24 - Saw PCP Dr. Gastelum - Was felt to have muscle spasms but was sent for labs and an x-ray.    upon dx WBC: 12.59, Hgb: 7.7, Hct: 31.2, MCV: 73.2, Plts: 500 Ferritin: 4, TSAT: 5% SPEP: M-spike 1.7 SIFE: IgG Kappa monoclonal band present Serum light chains: Kappa: 592, Lambda: 11.2, K/L FLC Ratio: 50.63 No QIG's available, No CMP.   Daughter reports had spine X-ray ([facility]) that showed that showed a T7 and T8 compression fracture. (Daughter is a physical therapist and somewhat familiar with these medical terms).   Patient recall being told she had AVM's about 4 years ago.  Had anemia - was sent to GI for an evaluation (Dr. Mian Jaimes) and she reports she had a colonoscopy where AVM's were seen and cauterized.   States she has had a few subsequent episodes of anemia - would be sent back to GI and have another procedure.  Then she would be instructed to take PO iron for a short period of time after and her anemia would resolve.  She has never had a blood transfusion or IV iron. She denies a personal or family hx of bleeding or clotting disorders.  She reports she just saw Dr. Villanueva 7/9/24 - was instructed to start on PO iron, folic acid, and Vitamin C. Per daughter it sounds like he was considering a procedure but wanted to work around every else.  Patient does admit to chronic fatigue. She reports she has had pica for ice for a very long time.  She denies any breathing issues but her daughter feels she has SOB and wheezing on occasion.    MRI thoracic spine without contrast 7/19/24- chronic compression fracture above T6.  Abnormal marrow signal in T8 vertebral body which could represent a stress fracture.  Bulging at T6-7.  Cervical disc abnormalities at C4-5, C5-6.  PET/CT 7/24/24- severe compression deformity at T7 with increased uptake, SUV 6.2.  Healing rib fracture at the anteromedial right third rib, SUV 3.5.  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  Bone marrow biopsy 7/17/24- plasma cell myeloma, 18% by aspirate.  Trilineage hematopoiesis, no iron stores.  IHC  >10% but fragmented marrow.  Cytogenetics- 51,X,-X,add(2)(p11.2),+3,+5,+5,idic(6)(q21),+7,+9,-13,+15,+19,add(20)(q13.1)[6]/46,XX[14] ABNORMAL FISH MULTIPLE MYELOMA PANEL - RB1 deletion detected (11%)   Patient is a 65 year old female, who presents to discuss the role of an autologous HSCT in the management of their multiple myeloma. I had the pleasure of seeing the patient in consultation, who was referred by Dr Goetz. Patient is accompanied by daughter.   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   ---------Oncologic Hx:------------  Multiple Myeloma IgG kappa   ---------Interval Hx:----------------   ---------Treatment Hx------------- Status post 3 cycles of RVD, bortezomib, lenalidomide, and dexamethasone (VRd). Radiation treatment no specificy location, time and dates.   ---------Pathology Hx------------- Initial Pre-treatment Biopsy BM: Date 7/17/24 ---Morphology: above ---IHC stain: ---FISH: rb1 del ---Karyotype: complex as above ---NGS:     XXXXXXX  Biopsy (of what): Date ---Morphology: ---IHC stain: ---FISH: ---Karyotype: ---NGS:    ---------Imaging Hx---------------- above   ---------Social:----------------------- -The patient was born on [place] and currently lives here - Patient lives with her daughter and daughter's fiance. - Patient has older siblings. 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  -  no smoker, no alcohol use, no other drug use   --------------------Surgical Hx: ------------------------     --------------------Past Family Hx:----------------------                       [de-identified] : 11/19/24: Patient is here for a follow up visit to discuss autologous stem cell transplant. Completed cycle 4 RVD. Bone marrow biopsy today. Overall, well and offers no acute concerns.  12/17/24 Pt s/p mobilization and collection with good yield..here for catheter care...pt had issue with dressing which came off in the shower..daughter at side..no complaints

## 2025-01-09 NOTE — HISTORY OF PRESENT ILLNESS
[de-identified] :   64 yo female s/p auto-HSCT for myeloma     Status post auto transplant, day + 12 Transplant physician: ***Prudence Referring physician: ***Bishnu Diagnosis: ***MM Disease staging at transplant: ***VGPR Prognostic features: *** Conditioning regimen: ***ambar 200   -----------Interim History:-----------   ---------Oncologic Hx:--------------- (see pre-admission note)    --------Treatment Hx: --------------- ( see preadmission notes)   --------Pathology Hx:---------------- (see preadmission notes and add if anything additional)   -------- Hospital Course:-----------not complicated, mild mucositis   ---------Post Transplant Course:--    ---------Past Surgical Hx----------    -------- -Social Hx -----------------  see pre-admission note)     1/8/25 appetite not the best, weak at times and unsteady on feet

## 2025-01-09 NOTE — HISTORY OF PRESENT ILLNESS
[de-identified] :   66 yo female s/p auto-HSCT for myeloma     Status post auto transplant, day + 12 Transplant physician: ***Prudence Referring physician: ***Bishnu Diagnosis: ***MM Disease staging at transplant: ***VGPR Prognostic features: *** Conditioning regimen: ***ambar 200   -----------Interim History:-----------   ---------Oncologic Hx:--------------- (see pre-admission note)    --------Treatment Hx: --------------- ( see preadmission notes)   --------Pathology Hx:---------------- (see preadmission notes and add if anything additional)   -------- Hospital Course:-----------not complicated, mild mucositis   ---------Post Transplant Course:--    ---------Past Surgical Hx----------    -------- -Social Hx -----------------  see pre-admission note)     1/8/25 appetite not the best, weak at times and unsteady on feet

## 2025-01-09 NOTE — ASSESSMENT
[FreeTextEntry1] :   64 yo female s/p auto-transplant for multiple myeloma, hx CAD  Day 12 Disease: Status post-transplant: CIBMTR Post Transplant Disease Assessment Post transplant therapy: rev low dose Indication: Post Transplant Therapy Indication   Engraftment: ANC engraftment date: *** (First of 3 consecutive measurements on different days with ANC equal or more than 0.5) Platelet engraftment date: *** (First of 3 consecutive measurements on different days with plts equal or more than 20 with no plt transfusion in the previous 7 days)   PLAN: Disease monitoring / restaging day 100 -   Engraftment - G-CSF support: wbc 15..given today..anc 1500 1/7... - Transfusion requirements: none today plt 42   ID - Prophylaxis: ------PCP: PCP Prophylaxis. On sulfa-trim:  ------HSV and VZV: acyclovir 800 mg ------Fungal: ***no ------SOS: NA - Continue CMV monitoring weekly -Reactivation post-transplant: yes; no   - Vaccinations -----Eligible at day +180 -----High dose influenza, eligible at day +90 -----COVID per CDC guidelines for immunocompromised patients   Other -Reviewed signs and symptoms to report to clinic; patient has clinic and after-hours number  -1 liter of NS today with k repletion Follow-up: 1 week   Provider Prudence ORTIZ NP [facility] Adult Transplantation and Cellular Therapy Program

## 2025-01-09 NOTE — ASSESSMENT
[FreeTextEntry1] :   66 yo female s/p auto-transplant for multiple myeloma, hx CAD  Day 12 Disease: Status post-transplant: CIBMTR Post Transplant Disease Assessment Post transplant therapy: rev low dose Indication: Post Transplant Therapy Indication   Engraftment: ANC engraftment date: *** (First of 3 consecutive measurements on different days with ANC equal or more than 0.5) Platelet engraftment date: *** (First of 3 consecutive measurements on different days with plts equal or more than 20 with no plt transfusion in the previous 7 days)   PLAN: Disease monitoring / restaging day 100 -   Engraftment - G-CSF support: wbc 15..given today..anc 1500 1/7... - Transfusion requirements: none today plt 42   ID - Prophylaxis: ------PCP: PCP Prophylaxis. On sulfa-trim:  ------HSV and VZV: acyclovir 800 mg ------Fungal: ***no ------SOS: NA - Continue CMV monitoring weekly -Reactivation post-transplant: yes; no   - Vaccinations -----Eligible at day +180 -----High dose influenza, eligible at day +90 -----COVID per CDC guidelines for immunocompromised patients   Other -Reviewed signs and symptoms to report to clinic; patient has clinic and after-hours number  -1 liter of NS today with k repletion Follow-up: 1 week   Provider Prudence ORTIZ NP [facility] Adult Transplantation and Cellular Therapy Program

## 2025-01-09 NOTE — REASON FOR VISIT
[Follow-Up Visit] : a follow-up visit for [FreeTextEntry2] : Multiple Myeloma status post AUTO PBSCT

## 2025-01-17 NOTE — HISTORY OF PRESENT ILLNESS
Faxed. [de-identified] :   66 yo female s/p auto-HSCT for myeloma  doing well overall   Status post auto transplant, day + 19 Transplant physician: ***Prudence Referring physician: ***Bishnu Diagnosis: ***MM Disease staging at transplant: ***VGPR Prognostic features: *** Conditioning regimen: ***ambar 200   -----------Interim History:-----------   ---------Oncologic Hx:--------------- (see pre-admission note)    --------Treatment Hx: --------------- ( see preadmission notes)   --------Pathology Hx:---------------- (see preadmission notes and add if anything additional)   -------- Hospital Course:-----------not complicated, mild mucositis   ---------Post Transplant Course:--    ---------Past Surgical Hx----------    -------- -Social Hx -----------------  see pre-admission note)     1/8/25 appetite not the best, weak at times and unsteady on feet   1/15/25: Day + 19 post transplant. Overall, well and offers no acute concerns today.

## 2025-01-17 NOTE — ASSESSMENT
[FreeTextEntry1] :   64 yo female s/p auto-transplant for multiple myeloma, hx CAD  Day +19 Disease: Status post-transplant: CIBMTR Post Transplant Disease Assessment Post transplant therapy: rev low dose Indication: Post Transplant Therapy Indication   Engraftment: ANC engraftment date: *** (First of 3 consecutive measurements on different days with ANC equal or more than 0.5) Platelet engraftment date: *** (First of 3 consecutive measurements on different days with plts equal or more than 20 with no plt transfusion in the previous 7 days)   PLAN: Disease monitoring / restaging day 100 -   Engraftment - G-CSF support: wbc 15..last week.anc 1500 1/7... - Transfusion requirements: none today plt WNL's   ID - Prophylaxis: ------PCP: PCP Prophylaxis. On sulfa-trim:  ------HSV and VZV: acyclovir 800 mg ------Fungal: ***no ------SOS: NA - Continue CMV monitoring weekly -Reactivation post-transplant: yes; no   - Vaccinations -----Eligible at day +180 -----High dose influenza, eligible at day +90 -----COVID per CDC guidelines for immunocompromised patients   Other -Reviewed signs and symptoms to report to clinic; patient has clinic and after-hours number Follow-up: 1 week for dressing change/labs from line and provider appointment.  will arrange for catheter removal   Provider Prudence ORTIZ NP [facility] Adult Transplantation and Cellular Therapy Program

## 2025-01-17 NOTE — PHYSICAL EXAM
[Normal] : normal appearance, no rash, nodules, vesicles, ulcers, erythema [de-identified] : Anterior chest tunneled catheter

## 2025-01-17 NOTE — PHYSICAL EXAM
[Normal] : normal appearance, no rash, nodules, vesicles, ulcers, erythema [de-identified] : Anterior chest tunneled catheter

## 2025-01-17 NOTE — HISTORY OF PRESENT ILLNESS
[de-identified] :   64 yo female s/p auto-HSCT for myeloma  doing well overall   Status post auto transplant, day + 19 Transplant physician: ***Prudence Referring physician: ***Bishnu Diagnosis: ***MM Disease staging at transplant: ***VGPR Prognostic features: *** Conditioning regimen: ***ambar 200   -----------Interim History:-----------   ---------Oncologic Hx:--------------- (see pre-admission note)    --------Treatment Hx: --------------- ( see preadmission notes)   --------Pathology Hx:---------------- (see preadmission notes and add if anything additional)   -------- Hospital Course:-----------not complicated, mild mucositis   ---------Post Transplant Course:--    ---------Past Surgical Hx----------    -------- -Social Hx -----------------  see pre-admission note)     1/8/25 appetite not the best, weak at times and unsteady on feet   1/15/25: Day + 19 post transplant. Overall, well and offers no acute concerns today.

## 2025-01-17 NOTE — ASSESSMENT
[FreeTextEntry1] :   66 yo female s/p auto-transplant for multiple myeloma, hx CAD  Day +19 Disease: Status post-transplant: CIBMTR Post Transplant Disease Assessment Post transplant therapy: rev low dose Indication: Post Transplant Therapy Indication   Engraftment: ANC engraftment date: *** (First of 3 consecutive measurements on different days with ANC equal or more than 0.5) Platelet engraftment date: *** (First of 3 consecutive measurements on different days with plts equal or more than 20 with no plt transfusion in the previous 7 days)   PLAN: Disease monitoring / restaging day 100 -   Engraftment - G-CSF support: wbc 15..last week.anc 1500 1/7... - Transfusion requirements: none today plt WNL's   ID - Prophylaxis: ------PCP: PCP Prophylaxis. On sulfa-trim:  ------HSV and VZV: acyclovir 800 mg ------Fungal: ***no ------SOS: NA - Continue CMV monitoring weekly -Reactivation post-transplant: yes; no   - Vaccinations -----Eligible at day +180 -----High dose influenza, eligible at day +90 -----COVID per CDC guidelines for immunocompromised patients   Other -Reviewed signs and symptoms to report to clinic; patient has clinic and after-hours number Follow-up: 1 week for dressing change/labs from line and provider appointment.  will arrange for catheter removal   Provider Prudence ORTIZ NP [facility] Adult Transplantation and Cellular Therapy Program

## 2025-01-17 NOTE — REASON FOR VISIT
[Follow-Up Visit] : a follow-up visit for [Family Member] : family member [FreeTextEntry2] : Multiple Myeloma status post AUTO PBSCT

## 2025-01-19 NOTE — ASSESSMENT
[FreeTextEntry1] :   66 yo female s/p auto-transplant for multiple myeloma, hx CAD  Day +19 Disease: Status post-transplant: CIBMTR Post Transplant Disease Assessment Post transplant therapy: rev low dose Indication: Post Transplant Therapy Indication   Engraftment: ANC engraftment date: *** (First of 3 consecutive measurements on different days with ANC equal or more than 0.5) Platelet engraftment date: *** (First of 3 consecutive measurements on different days with plts equal or more than 20 with no plt transfusion in the previous 7 days)   PLAN: Disease monitoring / restaging day 100 -   Engraftment - G-CSF support: wbc 15..given today..anc 1500 1/7... - Transfusion requirements: none today plt 42   ID - Prophylaxis: ------PCP: PCP Prophylaxis. On sulfa-trim:  ------HSV and VZV: acyclovir 800 mg ------Fungal: ***no ------SOS: NA - Continue CMV monitoring weekly -Reactivation post-transplant: yes; no   - Vaccinations -----Eligible at day +180 -----High dose influenza, eligible at day +90 -----COVID per CDC guidelines for immunocompromised patients   Other -Reviewed signs and symptoms to report to clinic; patient has clinic and after-hours number  -1 liter of NS today with k repletion Follow-up: 1 week   Provider Prudence ORTIZ NP [facility] Adult Transplantation and Cellular Therapy Program

## 2025-01-19 NOTE — ASSESSMENT
[FreeTextEntry1] :   66 yo female s/p auto-transplant for multiple myeloma, hx CAD  Day +19 Disease: Status post-transplant: CIBMTR Post Transplant Disease Assessment Post transplant therapy: rev low dose Indication: Post Transplant Therapy Indication   Engraftment: ANC engraftment date: *** (First of 3 consecutive measurements on different days with ANC equal or more than 0.5) Platelet engraftment date: *** (First of 3 consecutive measurements on different days with plts equal or more than 20 with no plt transfusion in the previous 7 days)   PLAN: Disease monitoring / restaging day 100 -   Engraftment - G-CSF support: wbc 15..given today..anc 1500 1/7... - Transfusion requirements: none today plt 42   ID - Prophylaxis: ------PCP: PCP Prophylaxis. On sulfa-trim:  ------HSV and VZV: acyclovir 800 mg ------Fungal: ***no ------SOS: NA - Continue CMV monitoring weekly -Reactivation post-transplant: yes; no   - Vaccinations -----Eligible at day +180 -----High dose influenza, eligible at day +90 -----COVID per CDC guidelines for immunocompromised patients   Other -Reviewed signs and symptoms to report to clinic; patient has clinic and after-hours number  -1 liter of NS today with k repletion Follow-up: 1 week   Provider Prudence ORTIZ NP [facility] Adult Transplantation and Cellular Therapy Program

## 2025-01-19 NOTE — HISTORY OF PRESENT ILLNESS
[de-identified] :   64 yo female s/p auto-HSCT for myeloma     Status post auto transplant, day + 19 Transplant physician: ***Prudence Referring physician: ***Bishnu Diagnosis: ***MM Disease staging at transplant: ***VGPR Prognostic features: *** Conditioning regimen: ***ambar 200   -----------Interim History:-----------   ---------Oncologic Hx:--------------- (see pre-admission note)    --------Treatment Hx: --------------- ( see preadmission notes)   --------Pathology Hx:---------------- (see preadmission notes and add if anything additional)   -------- Hospital Course:-----------not complicated, mild mucositis   ---------Post Transplant Course:--    ---------Past Surgical Hx----------    -------- -Social Hx -----------------  see pre-admission note)     1/8/25 appetite not the best, weak at times and unsteady on feet    1/15/25: Day + 19 post AUTO PBSCT. Tunneled catheter in place.

## 2025-01-19 NOTE — HISTORY OF PRESENT ILLNESS
[de-identified] :   64 yo female s/p auto-HSCT for myeloma     Status post auto transplant, day + 19 Transplant physician: ***Prudence Referring physician: ***Bishnu Diagnosis: ***MM Disease staging at transplant: ***VGPR Prognostic features: *** Conditioning regimen: ***ambar 200   -----------Interim History:-----------   ---------Oncologic Hx:--------------- (see pre-admission note)    --------Treatment Hx: --------------- ( see preadmission notes)   --------Pathology Hx:---------------- (see preadmission notes and add if anything additional)   -------- Hospital Course:-----------not complicated, mild mucositis   ---------Post Transplant Course:--    ---------Past Surgical Hx----------    -------- -Social Hx -----------------  see pre-admission note)     1/8/25 appetite not the best, weak at times and unsteady on feet    1/15/25: Day + 19 post AUTO PBSCT. Tunneled catheter in place.

## 2025-01-26 NOTE — HISTORY OF PRESENT ILLNESS
[de-identified] :  66 yo female s/p auto-HSCT for myeloma   Status post auto transplant, day + 26 Transplant physician: Dr. Foss Referring physician: Dr. Goetz Diagnosis: Multiple Myeloma Disease staging at transplant: VGPR Prognostic features:  Conditioning regimen: Melphalan 200    ARI BARRIENTOS is a 65 y.o. 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 She would notice after certain movements, then would seem to resolve. Then on 6/29/24 she was playing on the floor with her puppy and developed acute severe upper back pains. On 7/2/24 she saw her PCP Dr. Gastelum - Was felt to have muscle spasms but was sent for labs and an x-ray. Upon dx WBC: 12.59, Hgb: 7.7, Hct: 31.2, MCV: 73.2, Plts: 500 Ferritin: 4, TSAT: 5% SPEP: M-spike 1.7 SIFE: IgG Kappa monoclonal band present Serum light chains: Kappa: 592, Lambda: 11.2, K/L FLC Ratio: 50.63 No QIG's available, No CMP.  Daughter reports had spine X-ray ([facility]) that showed that showed a T7 and T8 compression fracture.   Patient recall being told she had AVM's about 4 years ago. Had anemia - was sent to GI for an evaluation (Dr. Mian Jaimes) and she reports she had a colonoscopy where AVM's were seen and cauterized. States she has had a few subsequent episodes of anemia - would be sent back to GI and have another procedure. Then she would be instructed to take PO iron for a short period of time after and her anemia would resolve. She has never had a blood transfusion or IV iron. She denies a personal or family hx of bleeding or clotting disorders.She reports she just saw Dr. Villanueva 7/9/24 - was instructed to start on PO iron, folic acid, and Vitamin C. Per daughter it sounds like he was considering a procedure but wanted to work around every else.  Patient does admit to chronic fatigue. She reports she has had pica for ice for a very long time. She denies any breathing issues but her daughter feels she has SOB and wheezing on occasion.  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  --------------------Surgical Hx: ------------------------  ---------Oncologic Hx:------------  Multiple Myeloma IgG kappa  ---------Treatment Hx------------- Status post 3 cycles of RVD, bortezomib, lenalidomide, and dexamethasone (VRd). Radiation treatment no specificy location, time and dates.  ---------Pathology Hx------------- Bone marrow biopsy 7/17/24- plasma cell myeloma, 18% by aspirate. Trilineage hematopoiesis, no iron stores. IHC  >10% but fragmented marrow. Cytogenetics- 51,X,-X,add(2)(p11.2),+3,+5,+5,idic(6)(q21),+7,+9,-13,+15,+19,add(20)(q13.1)[6]/46,XX[14] ABNORMAL FISH MULTIPLE MYELOMA PANEL - RB1 deletion detected (11%)   ---------Imaging Hx---------------- MRI thoracic spine without contrast 7/19/24- chronic compression fracture above T6. Abnormal marrow signal in T8 vertebral body which could represent a stress fracture. Bulging at T6-7. Cervical disc abnormalities at C4-5, C5-6.  PET/CT 7/24/24- severe compression deformity at T7 with increased uptake, SUV 6.2. Healing rib fracture at the anteromedial right third rib, SUV 3.5. 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  ---------Social:----------------------- -The patient was born in [place] and lives on [place] - Patient lives with her daughter and daughter's fiance. - Patient has older siblings.  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 - no smoker, no alcohol use, no other drug use  -------- Hospital Course:----------- Uncomplicated, mild mucositis      [de-identified] : 1/22/25: Day + 26 post transplant. She reports generalized fatigue. Additionally c/o constant nausea, affecting ability to eat and drink. She is not eating full meals and unable to reach goal of 2L daily. She reports taking compazine twice daily that does not fully relieve nausea. Sent zofran to local pharmacy and discussed with patient plan to alternate zofran and compazine daily to help relieve nausea symptoms - patient and daughter verbalize understanding. Otherwise she denies fever, chills, SOB. Taking all medications appropriately.

## 2025-01-26 NOTE — PHYSICAL EXAM
[Restricted in physically strenuous activity but ambulatory and able to carry out work of a light or sedentary nature] : Status 1- Restricted in physically strenuous activity but ambulatory and able to carry out work of a light or sedentary nature, e.g., light house work, office work [Normal] : normoactive bowel sounds, soft and nontender, no hepatosplenomegaly or masses appreciated [de-identified] : Anterior chest tunneled catheter site clean

## 2025-01-26 NOTE — HISTORY OF PRESENT ILLNESS
[de-identified] :  64 yo female s/p auto-HSCT for myeloma   Status post auto transplant, day + 26 Transplant physician: Dr. Foss Referring physician: Dr. Goetz Diagnosis: Multiple Myeloma Disease staging at transplant: VGPR Prognostic features:  Conditioning regimen: Melphalan 200    ARI BARRIENTOS is a 65 y.o. 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 She would notice after certain movements, then would seem to resolve. Then on 6/29/24 she was playing on the floor with her puppy and developed acute severe upper back pains. On 7/2/24 she saw her PCP Dr. Gastelum - Was felt to have muscle spasms but was sent for labs and an x-ray. Upon dx WBC: 12.59, Hgb: 7.7, Hct: 31.2, MCV: 73.2, Plts: 500 Ferritin: 4, TSAT: 5% SPEP: M-spike 1.7 SIFE: IgG Kappa monoclonal band present Serum light chains: Kappa: 592, Lambda: 11.2, K/L FLC Ratio: 50.63 No QIG's available, No CMP.  Daughter reports had spine X-ray ([facility]) that showed that showed a T7 and T8 compression fracture.   Patient recall being told she had AVM's about 4 years ago. Had anemia - was sent to GI for an evaluation (Dr. Mian Jaimes) and she reports she had a colonoscopy where AVM's were seen and cauterized. States she has had a few subsequent episodes of anemia - would be sent back to GI and have another procedure. Then she would be instructed to take PO iron for a short period of time after and her anemia would resolve. She has never had a blood transfusion or IV iron. She denies a personal or family hx of bleeding or clotting disorders.She reports she just saw Dr. Villanueva 7/9/24 - was instructed to start on PO iron, folic acid, and Vitamin C. Per daughter it sounds like he was considering a procedure but wanted to work around every else.  Patient does admit to chronic fatigue. She reports she has had pica for ice for a very long time. She denies any breathing issues but her daughter feels she has SOB and wheezing on occasion.  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  --------------------Surgical Hx: ------------------------  ---------Oncologic Hx:------------  Multiple Myeloma IgG kappa  ---------Treatment Hx------------- Status post 3 cycles of RVD, bortezomib, lenalidomide, and dexamethasone (VRd). Radiation treatment no specificy location, time and dates.  ---------Pathology Hx------------- Bone marrow biopsy 7/17/24- plasma cell myeloma, 18% by aspirate. Trilineage hematopoiesis, no iron stores. IHC  >10% but fragmented marrow. Cytogenetics- 51,X,-X,add(2)(p11.2),+3,+5,+5,idic(6)(q21),+7,+9,-13,+15,+19,add(20)(q13.1)[6]/46,XX[14] ABNORMAL FISH MULTIPLE MYELOMA PANEL - RB1 deletion detected (11%)   ---------Imaging Hx---------------- MRI thoracic spine without contrast 7/19/24- chronic compression fracture above T6. Abnormal marrow signal in T8 vertebral body which could represent a stress fracture. Bulging at T6-7. Cervical disc abnormalities at C4-5, C5-6.  PET/CT 7/24/24- severe compression deformity at T7 with increased uptake, SUV 6.2. Healing rib fracture at the anteromedial right third rib, SUV 3.5. 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  ---------Social:----------------------- -The patient was born in [place] and lives on [address] - Patient lives with her daughter and daughter's fiance. - Patient has older siblings.  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 - no smoker, no alcohol use, no other drug use  -------- Hospital Course:----------- Uncomplicated, mild mucositis      [de-identified] : 1/22/25: Day + 26 post transplant. She reports generalized fatigue. Additionally c/o constant nausea, affecting ability to eat and drink. She is not eating full meals and unable to reach goal of 2L daily. She reports taking compazine twice daily that does not fully relieve nausea. Sent zofran to local pharmacy and discussed with patient plan to alternate zofran and compazine daily to help relieve nausea symptoms - patient and daughter verbalize understanding. Otherwise she denies fever, chills, SOB. Taking all medications appropriately.

## 2025-01-26 NOTE — ASSESSMENT
[FreeTextEntry1] : 64 yo female s/p auto-transplant for multiple myeloma  Day +26  Disease: Multiple Myeloma  Status post-transplant: CIBMTR Post Transplant Disease Assessment - Not evaluated (plan for multiple myeloma panel day +6)  Post transplant therapy: rev low dose, pending evaluation with primary hematology  Indication: Maintenance    Engraftment: ANC engraftment date: 1/7/25 (First of 3 consecutive measurements on different days with ANC equal or more than 0.5) Platelet engraftment date: Estimated 1/13/25, pending 1 additional measurement (First of 3 consecutive measurements on different days with plts equal or more than 20 with no plt transfusion in the previous 7 days)   PLAN: Disease monitoring  - Plan for multiple myeloma panel day +60   Engraftment - G-CSF support: no - Transfusion requirements: none   ID - Prophylaxis: ------PCP: PCP Prophylaxis. On sulfa-trim: yes ------HSV and VZV: acyclovir 800 mg - Continue CMV monitoring weekly - Monitor hepatitis B core PCR monthly; last sent 1/22/25 - Reactivation post-transplant: yes; no   - Vaccinations -----Eligible at day +180 -----High dose influenza, eligible at day +90 -----COVID per CDC guidelines for immunocompromised patients   Other -Unrelieved nausea   - Rotate compazine 10mg PO with zofran 4-8mg PO alternating as needed for nausea   - Advised to premedicate prior to meals, medications   -Line removed, site intact -Reviewed signs and symptoms to report to clinic; patient has clinic and after-hours number  Follow-up: 1/29 labs, provider visit    Audrey Damico NP  [facility] Adult Transplantation and Cellular Therapy Program

## 2025-01-26 NOTE — PHYSICAL EXAM
[Restricted in physically strenuous activity but ambulatory and able to carry out work of a light or sedentary nature] : Status 1- Restricted in physically strenuous activity but ambulatory and able to carry out work of a light or sedentary nature, e.g., light house work, office work [Normal] : normoactive bowel sounds, soft and nontender, no hepatosplenomegaly or masses appreciated [de-identified] : Anterior chest tunneled catheter site clean

## 2025-01-26 NOTE — ASSESSMENT
[FreeTextEntry1] : 64 yo female s/p auto-transplant for multiple myeloma  Day +26  Disease: Multiple Myeloma  Status post-transplant: CIBMTR Post Transplant Disease Assessment - Not evaluated (plan for multiple myeloma panel day +6)  Post transplant therapy: rev low dose, pending evaluation with primary hematology  Indication: Maintenance    Engraftment: ANC engraftment date: 1/7/25 (First of 3 consecutive measurements on different days with ANC equal or more than 0.5) Platelet engraftment date: Estimated 1/13/25, pending 1 additional measurement (First of 3 consecutive measurements on different days with plts equal or more than 20 with no plt transfusion in the previous 7 days)   PLAN: Disease monitoring  - Plan for multiple myeloma panel day +60   Engraftment - G-CSF support: no - Transfusion requirements: none   ID - Prophylaxis: ------PCP: PCP Prophylaxis. On sulfa-trim: yes ------HSV and VZV: acyclovir 800 mg - Continue CMV monitoring weekly - Monitor hepatitis B core PCR monthly; last sent 1/22/25 - Reactivation post-transplant: yes; no   - Vaccinations -----Eligible at day +180 -----High dose influenza, eligible at day +90 -----COVID per CDC guidelines for immunocompromised patients   Other -Unrelieved nausea   - Rotate compazine 10mg PO with zofran 4-8mg PO alternating as needed for nausea   - Advised to premedicate prior to meals, medications   -Line removed, site intact -Reviewed signs and symptoms to report to clinic; patient has clinic and after-hours number  Follow-up: 1/29 labs, provider visit    Audrey Damico NP  [facility] Adult Transplantation and Cellular Therapy Program

## 2025-01-26 NOTE — REVIEW OF SYSTEMS
[Negative] : Neurological [Vomiting] : no vomiting [Constipation] : no constipation [FreeTextEntry7] : nausea

## 2025-01-26 NOTE — ASSESSMENT
[FreeTextEntry1] : 64 yo female s/p auto-transplant for multiple myeloma  Day +26  Disease: Multiple Myeloma  Status post-transplant: CIBMTR Post Transplant Disease Assessment - Not evaluated (plan for multiple myeloma panel day +6)  Post transplant therapy: rev low dose, pending evaluation with primary hematology  Indication: Maintenance    Engraftment: ANC engraftment date: 1/7/25 (First of 3 consecutive measurements on different days with ANC equal or more than 0.5) Platelet engraftment date: Estimated 1/13/25, pending 1 additional measurement (First of 3 consecutive measurements on different days with plts equal or more than 20 with no plt transfusion in the previous 7 days)   PLAN: Disease monitoring  - Plan for multiple myeloma panel day +60   Engraftment - G-CSF support: no - Transfusion requirements: none   ID - Prophylaxis: ------PCP: PCP Prophylaxis. On sulfa-trim: yes ------HSV and VZV: acyclovir 800 mg - Continue CMV monitoring weekly - Monitor hepatitis B core PCR monthly; last sent 1/22/25 - Reactivation post-transplant: yes; no   - Vaccinations -----Eligible at day +180 -----High dose influenza, eligible at day +90 -----COVID per CDC guidelines for immunocompromised patients   Other -Unrelieved nausea   - Rotate compazine 10mg PO with zofran 4-8mg PO alternating as needed for nausea   - Advised to premedicate prior to meals, medications   -Line removed, site intact -Reviewed signs and symptoms to report to clinic; patient has clinic and after-hours number  Follow-up: 1/29 labs, provider visit    Audrey Damico NP  [facility] Adult Transplantation and Cellular Therapy Program

## 2025-01-26 NOTE — PHYSICAL EXAM
[Restricted in physically strenuous activity but ambulatory and able to carry out work of a light or sedentary nature] : Status 1- Restricted in physically strenuous activity but ambulatory and able to carry out work of a light or sedentary nature, e.g., light house work, office work [Normal] : normoactive bowel sounds, soft and nontender, no hepatosplenomegaly or masses appreciated [de-identified] : Anterior chest tunneled catheter site clean

## 2025-01-26 NOTE — HISTORY OF PRESENT ILLNESS
[de-identified] :  66 yo female s/p auto-HSCT for myeloma   Status post auto transplant, day + 26 Transplant physician: Dr. Foss Referring physician: Dr. Goetz Diagnosis: Multiple Myeloma Disease staging at transplant: VGPR Prognostic features:  Conditioning regimen: Melphalan 200    ARI BARRIENTOS is a 65 y.o. 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 She would notice after certain movements, then would seem to resolve. Then on 6/29/24 she was playing on the floor with her puppy and developed acute severe upper back pains. On 7/2/24 she saw her PCP Dr. Gastelum - Was felt to have muscle spasms but was sent for labs and an x-ray. Upon dx WBC: 12.59, Hgb: 7.7, Hct: 31.2, MCV: 73.2, Plts: 500 Ferritin: 4, TSAT: 5% SPEP: M-spike 1.7 SIFE: IgG Kappa monoclonal band present Serum light chains: Kappa: 592, Lambda: 11.2, K/L FLC Ratio: 50.63 No QIG's available, No CMP.  Daughter reports had spine X-ray ([facility]) that showed that showed a T7 and T8 compression fracture.   Patient recall being told she had AVM's about 4 years ago. Had anemia - was sent to GI for an evaluation (Dr. Mian Jaimes) and she reports she had a colonoscopy where AVM's were seen and cauterized. States she has had a few subsequent episodes of anemia - would be sent back to GI and have another procedure. Then she would be instructed to take PO iron for a short period of time after and her anemia would resolve. She has never had a blood transfusion or IV iron. She denies a personal or family hx of bleeding or clotting disorders.She reports she just saw Dr. Villanueva 7/9/24 - was instructed to start on PO iron, folic acid, and Vitamin C. Per daughter it sounds like he was considering a procedure but wanted to work around every else.  Patient does admit to chronic fatigue. She reports she has had pica for ice for a very long time. She denies any breathing issues but her daughter feels she has SOB and wheezing on occasion.  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  --------------------Surgical Hx: ------------------------  ---------Oncologic Hx:------------  Multiple Myeloma IgG kappa  ---------Treatment Hx------------- Status post 3 cycles of RVD, bortezomib, lenalidomide, and dexamethasone (VRd). Radiation treatment no specificy location, time and dates.  ---------Pathology Hx------------- Bone marrow biopsy 7/17/24- plasma cell myeloma, 18% by aspirate. Trilineage hematopoiesis, no iron stores. IHC  >10% but fragmented marrow. Cytogenetics- 51,X,-X,add(2)(p11.2),+3,+5,+5,idic(6)(q21),+7,+9,-13,+15,+19,add(20)(q13.1)[6]/46,XX[14] ABNORMAL FISH MULTIPLE MYELOMA PANEL - RB1 deletion detected (11%)   ---------Imaging Hx---------------- MRI thoracic spine without contrast 7/19/24- chronic compression fracture above T6. Abnormal marrow signal in T8 vertebral body which could represent a stress fracture. Bulging at T6-7. Cervical disc abnormalities at C4-5, C5-6.  PET/CT 7/24/24- severe compression deformity at T7 with increased uptake, SUV 6.2. Healing rib fracture at the anteromedial right third rib, SUV 3.5. 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  ---------Social:----------------------- -The patient was born in [place] and lives on [address] - Patient lives with her daughter and daughter's fiance. - Patient has older siblings.  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 - no smoker, no alcohol use, no other drug use  -------- Hospital Course:----------- Uncomplicated, mild mucositis      [de-identified] : 1/22/25: Day + 26 post transplant. She reports generalized fatigue. Additionally c/o constant nausea, affecting ability to eat and drink. She is not eating full meals and unable to reach goal of 2L daily. She reports taking compazine twice daily that does not fully relieve nausea. Sent zofran to local pharmacy and discussed with patient plan to alternate zofran and compazine daily to help relieve nausea symptoms - patient and daughter verbalize understanding. Otherwise she denies fever, chills, SOB. Taking all medications appropriately.

## 2025-01-30 NOTE — PHYSICAL EXAM
[Restricted in physically strenuous activity but ambulatory and able to carry out work of a light or sedentary nature] : Status 1- Restricted in physically strenuous activity but ambulatory and able to carry out work of a light or sedentary nature, e.g., light house work, office work [Normal] : normal appearance, no rash, nodules, vesicles, ulcers, erythema [de-identified] : generalized weakness, kyphosis [de-identified] : R hand intention tremor noted

## 2025-01-30 NOTE — ASSESSMENT
[FreeTextEntry1] : 66 yo female s/p auto-transplant for multiple myeloma  Day +33 catheter removed Disease: Multiple Myeloma  Status post-transplant: Incomplete evaluation to determine  Post transplant therapy: rev low dose, pending evaluation with primary hematology  Indication: Maintenance    Engraftment: ANC engraftment date: 1/7/25 (First of 3 consecutive measurements on different days with ANC equal or more than 0.5) Platelet engraftment date: Estimated 1/13/25, pending 1 additional measurement (First of 3 consecutive measurements on different days with plts equal or more than 20 with no plt transfusion in the previous 7 days)   PLAN: Disease monitoring  - Plan for multiple myeloma panel day +60, 100   Engraftment - G-CSF support: no - Transfusion requirements: none   ID - Prophylaxis: ------PCP: PCP Prophylaxis. On sulfa-trim: yes ------HSV and VZV: acyclovir 800 mg - Continue CMV monitoring weekly - Monitor hepatitis B core PCR monthly; last negative on 1/22/25 - Reactivation post-transplant: yes; no   - Vaccinations -----Eligible at day +180 -----High dose influenza, eligible at day +90 -----COVID per CDC guidelines for immunocompromised patients   Other - New tremor, possibly r/t compazine vs deconditioning +/- dehydration ----Advised to hold compazine, monitor closely for improvement after stopping ----Advised increased PO hydation for goal 2L/d ----Electrolytes stable today, trend  -CINV, improving   - Zofran 4mg PO alternating as needed for nausea - Advised to hold compazine as above   - Advised to premedicate prior to meals, medications   -Line removed, site healed -Reviewed signs and symptoms to report to clinic; patient has clinic and after-hours number  Follow-up: 1 week  labs, provider visit     Jina Keyes NP  [facility] Adult Transplantation and Cellular Therapy Program

## 2025-01-30 NOTE — PHYSICAL EXAM
[Restricted in physically strenuous activity but ambulatory and able to carry out work of a light or sedentary nature] : Status 1- Restricted in physically strenuous activity but ambulatory and able to carry out work of a light or sedentary nature, e.g., light house work, office work [Normal] : normal appearance, no rash, nodules, vesicles, ulcers, erythema [de-identified] : generalized weakness, kyphosis [de-identified] : R hand intention tremor noted

## 2025-01-30 NOTE — HISTORY OF PRESENT ILLNESS
[de-identified] :  66 yo female s/p auto-HSCT for myeloma   Status post auto transplant, day + 33 (12/27/24) Transplant physician: Dr. Foss Referring physician: Dr. Goetz Diagnosis: Multiple Myeloma Disease staging at transplant: VGPR Prognostic features:  Conditioning regimen: Melphalan 200    ARI BARRIENTOS is a 65 y.o. 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 She would notice after certain movements, then would seem to resolve. Then on 6/29/24 she was playing on the floor with her puppy and developed acute severe upper back pains. On 7/2/24 she saw her PCP Dr. Gastelum - Was felt to have muscle spasms but was sent for labs and an x-ray. Upon dx WBC: 12.59, Hgb: 7.7, Hct: 31.2, MCV: 73.2, Plts: 500 Ferritin: 4, TSAT: 5% SPEP: M-spike 1.7 SIFE: IgG Kappa monoclonal band present Serum light chains: Kappa: 592, Lambda: 11.2, K/L FLC Ratio: 50.63 No QIG's available, No CMP.  Daughter reports had spine X-ray ([facility]) that showed that showed a T7 and T8 compression fracture.   Patient recall being told she had AVM's about 4 years ago. Had anemia - was sent to GI for an evaluation (Dr. Mian Jaimes) and she reports she had a colonoscopy where AVM's were seen and cauterized. States she has had a few subsequent episodes of anemia - would be sent back to GI and have another procedure. Then she would be instructed to take PO iron for a short period of time after and her anemia would resolve. She has never had a blood transfusion or IV iron. She denies a personal or family hx of bleeding or clotting disorders.She reports she just saw Dr. Villanueva 7/9/24 - was instructed to start on PO iron, folic acid, and Vitamin C. Per daughter it sounds like he was considering a procedure but wanted to work around every else.  Patient does admit to chronic fatigue. She reports she has had pica for ice for a very long time. She denies any breathing issues but her daughter feels she has SOB and wheezing on occasion.  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  --------------------Surgical Hx: ------------------------  ---------Oncologic Hx:------------  Multiple Myeloma IgG kappa  ---------Treatment Hx------------- Status post 3 cycles of RVD, bortezomib, lenalidomide, and dexamethasone (VRd). Radiation treatment no specificy location, time and dates.  ---------Pathology Hx------------- Bone marrow biopsy 7/17/24- plasma cell myeloma, 18% by aspirate. Trilineage hematopoiesis, no iron stores. IHC  >10% but fragmented marrow. Cytogenetics- 51,X,-X,add(2)(p11.2),+3,+5,+5,idic(6)(q21),+7,+9,-13,+15,+19,add(20)(q13.1)[6]/46,XX[14] ABNORMAL FISH MULTIPLE MYELOMA PANEL - RB1 deletion detected (11%)   ---------Imaging Hx---------------- MRI thoracic spine without contrast 7/19/24- chronic compression fracture above T6. Abnormal marrow signal in T8 vertebral body which could represent a stress fracture. Bulging at T6-7. Cervical disc abnormalities at C4-5, C5-6.  PET/CT 7/24/24- severe compression deformity at T7 with increased uptake, SUV 6.2. Healing rib fracture at the anteromedial right third rib, SUV 3.5. 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  ---------Social:----------------------- -The patient was born in [place] and lives on [address] - Patient lives with her daughter and daughter's fiance. - Patient has older siblings.  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 - no smoker, no alcohol use, no other drug use  -------- Hospital Course:----------- Uncomplicated, mild mucositis      [de-identified] : 1/22/25: Day + 26 post transplant. She reports generalized fatigue. Additionally c/o constant nausea, affecting ability to eat and drink. She is not eating full meals and unable to reach goal of 2L daily. She reports taking compazine twice daily that does not fully relieve nausea. Sent zofran to local pharmacy and discussed with patient plan to alternate zofran and compazine daily to help relieve nausea symptoms - patient and daughter verbalize understanding. Otherwise she denies fever, chills, SOB. Taking all medications appropriately.    Presents for day +33 follow up post transplant. Energy improved over past 2 days. Appetite also improving, eating2 meals and snacks, estimates 1L/day. Intermittent nausea relieved with PO compazine, denies emesis. Continues to endorse loose stool intermittently. Additionally reporting tremor to hands (R>L) multiple times/day x past few weeks.   Otherwise denies fevers/chills, headache, SOB, cough, bruising or bleeding, rash, pain. Taking all medications as instructed.

## 2025-01-30 NOTE — REVIEW OF SYSTEMS
[Fatigue] : fatigue [Negative] : Integumentary [Fever] : no fever [Chills] : no chills [Abdominal Pain] : no abdominal pain [Vomiting] : no vomiting [Constipation] : no constipation [Dizziness] : no dizziness [Fainting] : no fainting [Easy Bruising] : no tendency for easy bruising [FreeTextEntry2] : energy improving [FreeTextEntry7] : nausea intermittent/controlled, intermittent soft-loose stool [de-identified] : R>L hand tremor x 2 weeks

## 2025-01-30 NOTE — REVIEW OF SYSTEMS
[Fatigue] : fatigue [Negative] : Integumentary [Fever] : no fever [Chills] : no chills [Abdominal Pain] : no abdominal pain [Vomiting] : no vomiting [Constipation] : no constipation [Dizziness] : no dizziness [Fainting] : no fainting [Easy Bruising] : no tendency for easy bruising [FreeTextEntry2] : energy improving [FreeTextEntry7] : nausea intermittent/controlled, intermittent soft-loose stool [de-identified] : R>L hand tremor x 2 weeks

## 2025-01-30 NOTE — ASSESSMENT
[FreeTextEntry1] : 64 yo female s/p auto-transplant for multiple myeloma  Day +33 catheter removed Disease: Multiple Myeloma  Status post-transplant: Incomplete evaluation to determine  Post transplant therapy: rev low dose, pending evaluation with primary hematology  Indication: Maintenance    Engraftment: ANC engraftment date: 1/7/25 (First of 3 consecutive measurements on different days with ANC equal or more than 0.5) Platelet engraftment date: Estimated 1/13/25, pending 1 additional measurement (First of 3 consecutive measurements on different days with plts equal or more than 20 with no plt transfusion in the previous 7 days)   PLAN: Disease monitoring  - Plan for multiple myeloma panel day +60, 100   Engraftment - G-CSF support: no - Transfusion requirements: none   ID - Prophylaxis: ------PCP: PCP Prophylaxis. On sulfa-trim: yes ------HSV and VZV: acyclovir 800 mg - Continue CMV monitoring weekly - Monitor hepatitis B core PCR monthly; last negative on 1/22/25 - Reactivation post-transplant: yes; no   - Vaccinations -----Eligible at day +180 -----High dose influenza, eligible at day +90 -----COVID per CDC guidelines for immunocompromised patients   Other - New tremor, possibly r/t compazine vs deconditioning +/- dehydration ----Advised to hold compazine, monitor closely for improvement after stopping ----Advised increased PO hydation for goal 2L/d ----Electrolytes stable today, trend  -CINV, improving   - Zofran 4mg PO alternating as needed for nausea - Advised to hold compazine as above   - Advised to premedicate prior to meals, medications   -Line removed, site healed -Reviewed signs and symptoms to report to clinic; patient has clinic and after-hours number  Follow-up: 1 week  labs, provider visit     Jina Keyes NP  [facility] Adult Transplantation and Cellular Therapy Program

## 2025-01-30 NOTE — HISTORY OF PRESENT ILLNESS
[de-identified] :  64 yo female s/p auto-HSCT for myeloma   Status post auto transplant, day + 33 (12/27/24) Transplant physician: Dr. Foss Referring physician: Dr. Goetz Diagnosis: Multiple Myeloma Disease staging at transplant: VGPR Prognostic features:  Conditioning regimen: Melphalan 200    ARI BARRIENTOS is a 65 y.o. 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 She would notice after certain movements, then would seem to resolve. Then on 6/29/24 she was playing on the floor with her puppy and developed acute severe upper back pains. On 7/2/24 she saw her PCP Dr. Gastelum - Was felt to have muscle spasms but was sent for labs and an x-ray. Upon dx WBC: 12.59, Hgb: 7.7, Hct: 31.2, MCV: 73.2, Plts: 500 Ferritin: 4, TSAT: 5% SPEP: M-spike 1.7 SIFE: IgG Kappa monoclonal band present Serum light chains: Kappa: 592, Lambda: 11.2, K/L FLC Ratio: 50.63 No QIG's available, No CMP.  Daughter reports had spine X-ray ([facility]) that showed that showed a T7 and T8 compression fracture.   Patient recall being told she had AVM's about 4 years ago. Had anemia - was sent to GI for an evaluation (Dr. Mian Jaimes) and she reports she had a colonoscopy where AVM's were seen and cauterized. States she has had a few subsequent episodes of anemia - would be sent back to GI and have another procedure. Then she would be instructed to take PO iron for a short period of time after and her anemia would resolve. She has never had a blood transfusion or IV iron. She denies a personal or family hx of bleeding or clotting disorders.She reports she just saw Dr. Villanueva 7/9/24 - was instructed to start on PO iron, folic acid, and Vitamin C. Per daughter it sounds like he was considering a procedure but wanted to work around every else.  Patient does admit to chronic fatigue. She reports she has had pica for ice for a very long time. She denies any breathing issues but her daughter feels she has SOB and wheezing on occasion.  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  --------------------Surgical Hx: ------------------------  ---------Oncologic Hx:------------  Multiple Myeloma IgG kappa  ---------Treatment Hx------------- Status post 3 cycles of RVD, bortezomib, lenalidomide, and dexamethasone (VRd). Radiation treatment no specificy location, time and dates.  ---------Pathology Hx------------- Bone marrow biopsy 7/17/24- plasma cell myeloma, 18% by aspirate. Trilineage hematopoiesis, no iron stores. IHC  >10% but fragmented marrow. Cytogenetics- 51,X,-X,add(2)(p11.2),+3,+5,+5,idic(6)(q21),+7,+9,-13,+15,+19,add(20)(q13.1)[6]/46,XX[14] ABNORMAL FISH MULTIPLE MYELOMA PANEL - RB1 deletion detected (11%)   ---------Imaging Hx---------------- MRI thoracic spine without contrast 7/19/24- chronic compression fracture above T6. Abnormal marrow signal in T8 vertebral body which could represent a stress fracture. Bulging at T6-7. Cervical disc abnormalities at C4-5, C5-6.  PET/CT 7/24/24- severe compression deformity at T7 with increased uptake, SUV 6.2. Healing rib fracture at the anteromedial right third rib, SUV 3.5. 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  ---------Social:----------------------- -The patient was born in [place] and lives on [address] - Patient lives with her daughter and daughter's fiance. - Patient has older siblings.  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 - no smoker, no alcohol use, no other drug use  -------- Hospital Course:----------- Uncomplicated, mild mucositis      [de-identified] : 1/22/25: Day + 26 post transplant. She reports generalized fatigue. Additionally c/o constant nausea, affecting ability to eat and drink. She is not eating full meals and unable to reach goal of 2L daily. She reports taking compazine twice daily that does not fully relieve nausea. Sent zofran to local pharmacy and discussed with patient plan to alternate zofran and compazine daily to help relieve nausea symptoms - patient and daughter verbalize understanding. Otherwise she denies fever, chills, SOB. Taking all medications appropriately.    Presents for day +33 follow up post transplant. Energy improved over past 2 days. Appetite also improving, eating2 meals and snacks, estimates 1L/day. Intermittent nausea relieved with PO compazine, denies emesis. Continues to endorse loose stool intermittently. Additionally reporting tremor to hands (R>L) multiple times/day x past few weeks.   Otherwise denies fevers/chills, headache, SOB, cough, bruising or bleeding, rash, pain. Taking all medications as instructed.

## 2025-02-19 NOTE — ASSESSMENT
[FreeTextEntry1] : 66 yo female s/p auto-transplant for multiple myeloma  Day +53 catheter removed Disease: Multiple Myeloma  Status post-transplant: Incomplete evaluation to determine  Post transplant therapy: rev low dose, pending evaluation with primary hematology  Indication: Maintenance  rev plus or minus [place]   Engraftment: ANC engraftment date: 1/7/25 (First of 3 consecutive measurements on different days with ANC equal or more than 0.5) Platelet engraftment date: 1/15/25 (First of 3 consecutive measurements on different days with plts equal or more than 20 with no plt transfusion in the previous 7 days). Received platelets on 1/6/25.    PLAN: Disease monitoring  - Plan for multiple myeloma panel day +60, 100   Engraftment - G-CSF support: no - Transfusion requirements: none   ID - Prophylaxis: ------PCP: PCP Prophylaxis. On sulfa-trim: yes ------HSV and VZV: acyclovir 800 mg - Continue CMV monitoring weekly. Negative to date. - Monitor hepatitis B core PCR monthly; negative on 1/22/25 and 2/18/25. - Reactivation post-transplant:  no   - Vaccinations -----Eligible at day +180 -----High dose influenza, eligible at day +90 -----COVID per CDC guidelines for immunocompromised patients   Other - New tremor, possibly r/t compazine vs deconditioning +/- dehydration ----Advised to hold compazine, monitor closely for improvement after stopping..resolved ----Advised increased PO hydation for goal 2L/d ----Electrolytes stable today, trend..advised k rich foods  -CINV, improving   - Zofran 4mg PO alternating as needed for nausea - Advised to hold compazine as above   - Advised to premedicate prior to meals, medications   -Line removed, site healed -Reviewed signs and symptoms to report to clinic; patient has clinic and after-hours number  Follow-up:  In 2 week's for labs, provider visit     Examined patient with [facility] Adult Transplantation and Cellular Therapy Program

## 2025-02-19 NOTE — HISTORY OF PRESENT ILLNESS
[de-identified] :  66 yo female s/p auto-HSCT for myeloma   Status post auto transplant, day + 53 (12/27/24) Transplant physician: Dr. Foss Referring physician: Dr. Goetz Diagnosis: Multiple Myeloma Disease staging at transplant: VGPR Prognostic features:  Conditioning regimen: Melphalan 200    ARI BARRIENTOS is a 65 y.o. 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 She would notice after certain movements, then would seem to resolve. Then on 6/29/24 she was playing on the floor with her puppy and developed acute severe upper back pains. On 7/2/24 she saw her PCP Dr. Gastelum - Was felt to have muscle spasms but was sent for labs and an x-ray. Upon dx WBC: 12.59, Hgb: 7.7, Hct: 31.2, MCV: 73.2, Plts: 500 Ferritin: 4, TSAT: 5% SPEP: M-spike 1.7 SIFE: IgG Kappa monoclonal band present Serum light chains: Kappa: 592, Lambda: 11.2, K/L FLC Ratio: 50.63 No QIG's available, No CMP.  Daughter reports had spine X-ray ([facility]) that showed that showed a T7 and T8 compression fracture.   Patient recall being told she had AVM's about 4 years ago. Had anemia - was sent to GI for an evaluation (Dr. Mian Jaimes) and she reports she had a colonoscopy where AVM's were seen and cauterized. States she has had a few subsequent episodes of anemia - would be sent back to GI and have another procedure. Then she would be instructed to take PO iron for a short period of time after and her anemia would resolve. She has never had a blood transfusion or IV iron. She denies a personal or family hx of bleeding or clotting disorders.She reports she just saw Dr. Villanueva 7/9/24 - was instructed to start on PO iron, folic acid, and Vitamin C. Per daughter it sounds like he was considering a procedure but wanted to work around every else.  Patient does admit to chronic fatigue. She reports she has had pica for ice for a very long time. She denies any breathing issues but her daughter feels she has SOB and wheezing on occasion.  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  --------------------Surgical Hx: ------------------------  ---------Oncologic Hx:------------  Multiple Myeloma IgG kappa  ---------Treatment Hx------------- Status post 3 cycles of RVD, bortezomib, lenalidomide, and dexamethasone (VRd). Radiation treatment no specificy location, time and dates.  ---------Pathology Hx------------- Bone marrow biopsy 7/17/24- plasma cell myeloma, 18% by aspirate. Trilineage hematopoiesis, no iron stores. IHC  >10% but fragmented marrow. Cytogenetics- 51,X,-X,add(2)(p11.2),+3,+5,+5,idic(6)(q21),+7,+9,-13,+15,+19,add(20)(q13.1)[6]/46,XX[14] ABNORMAL FISH MULTIPLE MYELOMA PANEL - RB1 deletion detected (11%)   ---------Imaging Hx---------------- MRI thoracic spine without contrast 7/19/24- chronic compression fracture above T6. Abnormal marrow signal in T8 vertebral body which could represent a stress fracture. Bulging at T6-7. Cervical disc abnormalities at C4-5, C5-6.  PET/CT 7/24/24- severe compression deformity at T7 with increased uptake, SUV 6.2. Healing rib fracture at the anteromedial right third rib, SUV 3.5. 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  ---------Social:----------------------- -The patient was born in [place] and lives on [address] - Patient lives with her daughter and daughter's fiance. - Patient has older siblings.  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 - no smoker, no alcohol use, no other drug use  -------- Hospital Course:----------- Uncomplicated, mild mucositis      [de-identified] : 1/22/25: Day + 26 post transplant. She reports generalized fatigue. Additionally c/o constant nausea, affecting ability to eat and drink. She is not eating full meals and unable to reach goal of 2L daily. She reports taking compazine twice daily that does not fully relieve nausea. Sent zofran to local pharmacy and discussed with patient plan to alternate zofran and compazine daily to help relieve nausea symptoms - patient and daughter verbalize understanding. Otherwise she denies fever, chills, SOB. Taking all medications appropriately.    Presents for day +33 follow up post transplant. Energy improved over past 2 days. Appetite also improving, eating2 meals and snacks, estimates 1L/day. Intermittent nausea relieved with PO compazine, denies emesis. Continues to endorse loose stool intermittently. Additionally reporting tremor to hands (R>L) multiple times/day x past few weeks.   Otherwise denies fevers/chills, headache, SOB, cough, bruising or bleeding, rash, pain. Taking all medications as instructed.  2/18/25: Day + 53 post transplant. Overall, well and offers no acute concerns. States energy level continues to improve. Denies fever, chills, nausea, vomiting, diarrhea, rash, mouth sores, dysuria or any signs of active bleeding. Denies SOB, chest pain or B/L LE edema. Excited for daughters wedding next week.

## 2025-02-19 NOTE — PHYSICAL EXAM
[Restricted in physically strenuous activity but ambulatory and able to carry out work of a light or sedentary nature] : Status 1- Restricted in physically strenuous activity but ambulatory and able to carry out work of a light or sedentary nature, e.g., light house work, office work [Normal] : normal appearance, no rash, nodules, vesicles, ulcers, erythema [de-identified] : cath removed [de-identified] : generalized weakness, kyphosis improved [de-identified] : R hand intention tremor noted

## 2025-02-19 NOTE — REVIEW OF SYSTEMS
[Fatigue] : fatigue [Negative] : Integumentary [Fever] : no fever [Chills] : no chills [Abdominal Pain] : no abdominal pain [Vomiting] : no vomiting [Constipation] : no constipation [Dizziness] : no dizziness [Fainting] : no fainting [Easy Bruising] : no tendency for easy bruising [FreeTextEntry2] : energy improving [FreeTextEntry7] : nausea intermittent/controlled, intermittent soft-loose stool [de-identified] : R>L hand tremor x 2 weeks resolved

## 2025-03-05 NOTE — ASSESSMENT
[FreeTextEntry1] : 66 yo female s/p auto-transplant for multiple myeloma  Day +67 Disease: Multiple Myeloma  Status post-transplant: Incomplete evaluation to determine - repeat MM sent today, 3/4 Post transplant therapy: rev low dose, pending evaluation with primary hematology  Indication: Maintenance  rev plus or minus angeles   Engraftment: ANC engraftment date: 1/7/25 (First of 3 consecutive measurements on different days with ANC equal or more than 0.5) Platelet engraftment date: 1/15/25 (First of 3 consecutive measurements on different days with plts equal or more than 20 with no plt transfusion in the previous 7 days). Received platelets on 1/6/25.    PLAN: Disease monitoring  - Plan for multiple myeloma panel day +60, 100   Engraftment - G-CSF support: no - Transfusion requirements: none   ID - Prophylaxis: ------PCP: PCP Prophylaxis. On sulfa-trim: yes ------HSV and VZV: acyclovir 800 mg - Continue CMV monitoring weekly. Negative to date. - Monitor hepatitis B core PCR monthly; negative on 1/22/25 and 2/18/25. - Reactivation post-transplant:  no   - Vaccinations -----Eligible at day +180 -----High dose influenza, eligible at day +90 -----COVID per CDC guidelines for immunocompromised patients   HEME: 03/04/25: WBC 6.80, HGB 13.5,    Other - Refer back to primary hematology (Dr. Goetz)  - Line removed, site healed - Reviewed signs and symptoms to report to clinic; patient has clinic and after-hours number  Follow up with Dr. Foss  at day + 100 (week of 4/7) vaccines to start day 180...  Audrey Damico NP Transplantation and Cellular Therapy Program [facility]

## 2025-03-05 NOTE — PHYSICAL EXAM
[Restricted in physically strenuous activity but ambulatory and able to carry out work of a light or sedentary nature] : Status 1- Restricted in physically strenuous activity but ambulatory and able to carry out work of a light or sedentary nature, e.g., light house work, office work [Normal] : normal appearance, no rash, nodules, vesicles, ulcers, erythema [de-identified] : cath removed [de-identified] : generalized weakness, kyphosis improved [de-identified] : R hand intention tremor noted

## 2025-03-05 NOTE — ASSESSMENT
[FreeTextEntry1] : 66 yo female s/p auto-transplant for multiple myeloma  Day +67 Disease: Multiple Myeloma  Status post-transplant: Incomplete evaluation to determine - repeat MM sent today, 3/4 Post transplant therapy: rev low dose, pending evaluation with primary hematology  Indication: Maintenance  rev plus or minus angeles   Engraftment: ANC engraftment date: 1/7/25 (First of 3 consecutive measurements on different days with ANC equal or more than 0.5) Platelet engraftment date: 1/15/25 (First of 3 consecutive measurements on different days with plts equal or more than 20 with no plt transfusion in the previous 7 days). Received platelets on 1/6/25.    PLAN: Disease monitoring  - Plan for multiple myeloma panel day +60, 100   Engraftment - G-CSF support: no - Transfusion requirements: none   ID - Prophylaxis: ------PCP: PCP Prophylaxis. On sulfa-trim: yes ------HSV and VZV: acyclovir 800 mg - Continue CMV monitoring weekly. Negative to date. - Monitor hepatitis B core PCR monthly; negative on 1/22/25 and 2/18/25. - Reactivation post-transplant:  no   - Vaccinations -----Eligible at day +180 -----High dose influenza, eligible at day +90 -----COVID per CDC guidelines for immunocompromised patients   HEME: 03/04/25: WBC 6.80, HGB 13.5,    Other - Refer back to primary hematology (Dr. Goetz)  - Line removed, site healed - Reviewed signs and symptoms to report to clinic; patient has clinic and after-hours number  Follow up with Dr. Foss  at day + 100 (week of 4/7) vaccines to start day 180...  Audrey Damico NP Transplantation and Cellular Therapy Program [facility]

## 2025-03-05 NOTE — HISTORY OF PRESENT ILLNESS
[de-identified] :  64 yo female s/p auto-HSCT for myeloma   Status post auto transplant, day + 67 (12/27/24) Transplant physician: Dr. Foss Referring physician: Dr. Goetz Diagnosis: Multiple Myeloma Disease staging at transplant: VGPR Prognostic features:  Conditioning regimen: Melphalan 200    ARI BARRIENTOS is a 65 y.o. 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 She would notice after certain movements, then would seem to resolve. Then on 6/29/24 she was playing on the floor with her puppy and developed acute severe upper back pains. On 7/2/24 she saw her PCP Dr. Gastelum - Was felt to have muscle spasms but was sent for labs and an x-ray. Upon dx WBC: 12.59, Hgb: 7.7, Hct: 31.2, MCV: 73.2, Plts: 500 Ferritin: 4, TSAT: 5% SPEP: M-spike 1.7 SIFE: IgG Kappa monoclonal band present Serum light chains: Kappa: 592, Lambda: 11.2, K/L FLC Ratio: 50.63 No QIG's available, No CMP.  Daughter reports had spine X-ray ([facility]) that showed that showed a T7 and T8 compression fracture.   Patient recall being told she had AVM's about 4 years ago. Had anemia - was sent to GI for an evaluation (Dr. Mian Jaimes) and she reports she had a colonoscopy where AVM's were seen and cauterized. States she has had a few subsequent episodes of anemia - would be sent back to GI and have another procedure. Then she would be instructed to take PO iron for a short period of time after and her anemia would resolve. She has never had a blood transfusion or IV iron. She denies a personal or family hx of bleeding or clotting disorders.She reports she just saw Dr. Villanueva 7/9/24 - was instructed to start on PO iron, folic acid, and Vitamin C. Per daughter it sounds like he was considering a procedure but wanted to work around every else.  Patient does admit to chronic fatigue. She reports she has had pica for ice for a very long time. She denies any breathing issues but her daughter feels she has SOB and wheezing on occasion.  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  --------------------Surgical Hx: ------------------------  ---------Oncologic Hx:------------  Multiple Myeloma IgG kappa  ---------Treatment Hx------------- Status post 3 cycles of RVD, bortezomib, lenalidomide, and dexamethasone (VRd). Radiation treatment no specificy location, time and dates.  ---------Pathology Hx------------- Bone marrow biopsy 7/17/24- plasma cell myeloma, 18% by aspirate. Trilineage hematopoiesis, no iron stores. IHC  >10% but fragmented marrow. Cytogenetics- 51,X,-X,add(2)(p11.2),+3,+5,+5,idic(6)(q21),+7,+9,-13,+15,+19,add(20)(q13.1)[6]/46,XX[14] ABNORMAL FISH MULTIPLE MYELOMA PANEL - RB1 deletion detected (11%)   ---------Imaging Hx---------------- MRI thoracic spine without contrast 7/19/24- chronic compression fracture above T6. Abnormal marrow signal in T8 vertebral body which could represent a stress fracture. Bulging at T6-7. Cervical disc abnormalities at C4-5, C5-6.  PET/CT 7/24/24- severe compression deformity at T7 with increased uptake, SUV 6.2. Healing rib fracture at the anteromedial right third rib, SUV 3.5. 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  ---------Social:----------------------- -The patient was born in [place] and lives on [address] - Patient lives with her daughter and daughter's fiance. - Patient has older siblings.  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 - no smoker, no alcohol use, no other drug use  -------- Hospital Course:----------- Uncomplicated, mild mucositis      [de-identified] : 03.04.25: Day +67 post auto PBSCT. Patient is here for a follow up visit. She is feeling well with no complaints. Discussed plan to f/u with Dr. Goetz. Denies fever, chills, nausea, vomiting, diarrhea, rash, mouth sores or any signs of active bleeding. Denies SOB, chest pain or B/L LE edema.

## 2025-03-05 NOTE — HISTORY OF PRESENT ILLNESS
[de-identified] :  64 yo female s/p auto-HSCT for myeloma   Status post auto transplant, day + 67 (12/27/24) Transplant physician: Dr. Foss Referring physician: Dr. Goetz Diagnosis: Multiple Myeloma Disease staging at transplant: VGPR Prognostic features:  Conditioning regimen: Melphalan 200    ARI BARRIENTOS is a 65 y.o. 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 She would notice after certain movements, then would seem to resolve. Then on 6/29/24 she was playing on the floor with her puppy and developed acute severe upper back pains. On 7/2/24 she saw her PCP Dr. Gastelum - Was felt to have muscle spasms but was sent for labs and an x-ray. Upon dx WBC: 12.59, Hgb: 7.7, Hct: 31.2, MCV: 73.2, Plts: 500 Ferritin: 4, TSAT: 5% SPEP: M-spike 1.7 SIFE: IgG Kappa monoclonal band present Serum light chains: Kappa: 592, Lambda: 11.2, K/L FLC Ratio: 50.63 No QIG's available, No CMP.  Daughter reports had spine X-ray ([facility]) that showed that showed a T7 and T8 compression fracture.   Patient recall being told she had AVM's about 4 years ago. Had anemia - was sent to GI for an evaluation (Dr. Mian Jaimes) and she reports she had a colonoscopy where AVM's were seen and cauterized. States she has had a few subsequent episodes of anemia - would be sent back to GI and have another procedure. Then she would be instructed to take PO iron for a short period of time after and her anemia would resolve. She has never had a blood transfusion or IV iron. She denies a personal or family hx of bleeding or clotting disorders.She reports she just saw Dr. Villanueva 7/9/24 - was instructed to start on PO iron, folic acid, and Vitamin C. Per daughter it sounds like he was considering a procedure but wanted to work around every else.  Patient does admit to chronic fatigue. She reports she has had pica for ice for a very long time. She denies any breathing issues but her daughter feels she has SOB and wheezing on occasion.  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  --------------------Surgical Hx: ------------------------  ---------Oncologic Hx:------------  Multiple Myeloma IgG kappa  ---------Treatment Hx------------- Status post 3 cycles of RVD, bortezomib, lenalidomide, and dexamethasone (VRd). Radiation treatment no specificy location, time and dates.  ---------Pathology Hx------------- Bone marrow biopsy 7/17/24- plasma cell myeloma, 18% by aspirate. Trilineage hematopoiesis, no iron stores. IHC  >10% but fragmented marrow. Cytogenetics- 51,X,-X,add(2)(p11.2),+3,+5,+5,idic(6)(q21),+7,+9,-13,+15,+19,add(20)(q13.1)[6]/46,XX[14] ABNORMAL FISH MULTIPLE MYELOMA PANEL - RB1 deletion detected (11%)   ---------Imaging Hx---------------- MRI thoracic spine without contrast 7/19/24- chronic compression fracture above T6. Abnormal marrow signal in T8 vertebral body which could represent a stress fracture. Bulging at T6-7. Cervical disc abnormalities at C4-5, C5-6.  PET/CT 7/24/24- severe compression deformity at T7 with increased uptake, SUV 6.2. Healing rib fracture at the anteromedial right third rib, SUV 3.5. 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  ---------Social:----------------------- -The patient was born in [place] and lives on [address] - Patient lives with her daughter and daughter's fiance. - Patient has older siblings.  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 - no smoker, no alcohol use, no other drug use  -------- Hospital Course:----------- Uncomplicated, mild mucositis      [de-identified] : 03.04.25: Day +67 post auto PBSCT. Patient is here for a follow up visit. She is feeling well with no complaints. Discussed plan to f/u with Dr. Goetz. Denies fever, chills, nausea, vomiting, diarrhea, rash, mouth sores or any signs of active bleeding. Denies SOB, chest pain or B/L LE edema.

## 2025-03-05 NOTE — REVIEW OF SYSTEMS
[Fatigue] : fatigue [Negative] : Integumentary [Fever] : no fever [Chills] : no chills [Abdominal Pain] : no abdominal pain [Vomiting] : no vomiting [Constipation] : no constipation [Dizziness] : no dizziness [Fainting] : no fainting [Easy Bruising] : no tendency for easy bruising [FreeTextEntry2] : energy improving [FreeTextEntry7] : nausea intermittent/controlled, intermittent soft-loose stool [de-identified] : R>L hand tremor x 2 weeks resolved

## 2025-03-05 NOTE — REVIEW OF SYSTEMS
[Fatigue] : fatigue [Negative] : Integumentary [Fever] : no fever [Chills] : no chills [Abdominal Pain] : no abdominal pain [Vomiting] : no vomiting [Constipation] : no constipation [Dizziness] : no dizziness [Fainting] : no fainting [Easy Bruising] : no tendency for easy bruising [FreeTextEntry2] : energy improving [FreeTextEntry7] : nausea intermittent/controlled, intermittent soft-loose stool [de-identified] : R>L hand tremor x 2 weeks resolved

## 2025-03-05 NOTE — PHYSICAL EXAM
[Restricted in physically strenuous activity but ambulatory and able to carry out work of a light or sedentary nature] : Status 1- Restricted in physically strenuous activity but ambulatory and able to carry out work of a light or sedentary nature, e.g., light house work, office work [Normal] : normal appearance, no rash, nodules, vesicles, ulcers, erythema [de-identified] : cath removed [de-identified] : generalized weakness, kyphosis improved [de-identified] : R hand intention tremor noted

## 2025-03-28 NOTE — REVIEW OF SYSTEMS
[Patient Intake Form Reviewed] : Patient intake form was reviewed [Fatigue] : fatigue [Chest Pain] : chest pain [Constipation] : constipation [Diarrhea: Grade 0] : Diarrhea: Grade 0 [Muscle Pain] : muscle pain [Anxiety] : anxiety [Negative] : Heme/Lymph [Fever] : no fever [Chills] : no chills [Night Sweats] : no night sweats [Shortness Of Breath] : no shortness of breath [SOB on Exertion] : no shortness of breath during exertion [Abdominal Pain] : no abdominal pain [Vomiting] : no vomiting [FreeTextEntry3] : eye redness [FreeTextEntry5] : from ribs - occasional right flank- better [FreeTextEntry9] : Back pain from compression fractures- better

## 2025-03-28 NOTE — HISTORY OF PRESENT ILLNESS
[de-identified] : IgG kappa multiple myeloma Iron deficiency anemia/colonic AVM  ARI BARRIENTOS is a 65 y.o. 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   Patient reports ~ 6 month hx of intermittent right flank rib pains.  Would notice after certain movements, then would seem to resolve.   Then on 6/29/24 she was playing on the floor with her puppy and developed acute severe upper back pains.  7/2/24 - Saw PCP Dr. Gastelum - Was felt to have muscle spasms but was sent for labs and an x-ray.   WBC: 12.59, Hgb: 7.7, Hct: 31.2, MCV: 73.2, Plts: 500 Ferritin: 4, TSAT: 5% SPEP: M-spike 1.7 SIFE: IgG Kappa monoclonal band present Serum light chains: Kappa: 592, Lambda: 11.2, K/L FLC Ratio: 50.63 No QIG's available, No CMP.   Daughter reports had spine X-ray ([facility]) that showed that showed a T7 and T8 compression fracture. (Daughter is a physical therapist and somewhat familiar with these medical terms). Patient has been taking Percocet, but it is barely just getting the edge off.  She has an appointment with a spine doctor later today.   Patient recall being told she had AVM's about 4 years ago.  Had anemia - was sent to GI for an evaluation (Dr. Mian Jaimes) and she reports she had a colonoscopy where AVM's were seen and cauterized.   States she has had a few subsequent episodes of anemia - would be sent back to GI and have another procedure.  Then she would be instructed to take PO iron for a short period of time after and her anemia would resolve.  She has never had a blood transfusion or IV iron. She denies a personal or family hx of bleeding or clotting disorders.  She reports she just saw Dr. Villanueva 7/9/24 - was instructed to start on PO iron, folic acid, and Vitamin C. Per daughter it sounds like he was considering a procedure but wanted to work around every else.  Patient does admit to chronic fatigue. She reports she has had pica for ice for a very long time.  She denies any breathing issues but her daughter feels she has SOB and wheezing on occasion.    MRI thoracic spine without contrast 7/19/24- chronic compression fracture above T6.  Abnormal marrow signal in T8 vertebral body which could represent a stress fracture.  Bulging at T6-7.  Cervical disc abnormalities at C4-5, C5-6.  PET/CT 7/24/24- severe compression deformity at T7 with increased uptake, SUV 6.2.  Healing rib fracture at the anteromedial right third rib, SUV 3.5.  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  Bone marrow biopsy 7/17/24- plasma cell myeloma, 18% by aspirate.  Trilineage hematopoiesis, no iron stores.  IHC  >10% but fragmented marrow.  Cytogenetics- 51,X,-X,add(2)(p11.2),+3,+5,+5,idic(6)(q21),+7,+9,-13,+15,+19,add(20)(q13.1)[6]/46,XX[14] ABNORMAL FISH MULTIPLE MYELOMA PANEL - RB1 deletion detected (11%)  [de-identified] : S/p auto transplant today is day +91 Flu vaccine and follow up with Dr. Foss next week Reports transplant process was difficult for her due to loss of appetite and nausea Weight loss through diagnosis and treatment process but now eating more and weight stabilizing  Still has some occasional back discomfort, not taking any pain medications.  No fevers, chills, no LAD, no night sweats.

## 2025-03-28 NOTE — PHYSICAL EXAM
[Obese] : obese [Normal] : affect appropriate [de-identified] : anicteric [de-identified] : no c/c/e [de-identified] : no rashes

## 2025-03-28 NOTE — ASSESSMENT
[FreeTextEntry1] : Multiple myeloma. ISS-1.  Standard risk.   This is a 65 year old female who was found to have multiple compression fractures, work up with IgG kappa multiple myeloma along with iron deficiency anemia. She is no s/p MM tx with VRD, and auto stem cell transplant on 12/27/25.  7/3/24- Xray mild compression deformities of T7 and T8.   Labs on 7/12/24- Hg 7.7 Creatinine 0.88, calcium 9.2 total protein/albumin 8.5/4.0 SPEP- M spike 2.1, IgG 2789, kappa FLC 54.96/lambda FLC 0.85, ratio 64.66 7/5- Iron 29/sat 5/TIBC 528/ferritin 4  Iron deficiency/history of colonic AVM-  Given 4 doses of feraheme in July. Her Hg has since normalized.  Work up has confirmed multiple myeloma. ISS-1.  Standard risk.  MRI thoracic spine without contrast 7/19/24- chronic compression fracture above T6. Abnormal marrow signal in T8 vertebral body which could represent a stress fracture. Bulging at T6-7. Cervical disc abnormalities at C4-5, C5-6.  PET/CT 7/24/24- severe compression deformity at T7 with increased uptake, SUV 6.2. Healing rib fracture at the anteromedial right third rib, SUV 3.5. 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  Bone marrow biopsy 7/17/24- plasma cell myeloma, 18% by aspirate. Trilineage hematopoiesis, no iron stores. IHC  >10% but fragmented marrow. Cytogenetics- 51,X,-X,add(2)(p11.2),+3,+5,+5,idic(6)(q21),+7,+9,-13,+15,+19,add(20)(q13.1)[6]/46,XX[14] ABNORMAL FISH MULTIPLE MYELOMA PANEL - RB1 deletion detected (11%).  Started on RVD, bortezomib, lenalidomide, and dexamethasone (VRd). Velcade 1.3mg/m2 SQ weekly x 3, 1 week off.  Revlimid 25mg/day, days 1-21 every 28 days. Dexamethasone 20mg weekly.  Started on 8/9/24.  Currently cycle 4, day 15.   7/12/24- M spike 2.1, IgG 2789, k/l ratio 64.66 9/19/24- M spike 0.3, IgG 654, k/l ratio 1.35 10/17/24- M spike 0.1, IgG 590, k/l ratio 1.45 11/15/24- IgG 654, k/l FLC ratio    In [facility] - bone marrow biopsy 11/19/24- normocellular marrow with trilineage hematopoiesis, no evidence of plasma cell neoplasm.   Patient had an autologous stem cell transplant on 12/27/25.  Today is Day +91.  Thromboprophylaxis with aspirin daily (taking 325mg).   Antiviral prophylaxis with acyclovir 800mg twice a day.  PCP ppx with bactrim SS daily.  Zofran as needed for nausea. Bowel regimen for constipation.  Patient with mild back pain not requiring narcotics.   Plan for 10 mg Revlimid maintenance 10 weeks on one week off once she meets with Dr. Foss next week and is cleared to start (~Day +100). Will order after she sees Dr. Foss. Will continue aspirin daily with Revlimid maintenance Monitor hepatitis B core PCR monthly per transplant  CBC today - WBC 8.16, HGB 13.5, . CMP, immunoelectrophoresis drawn at today's visit. Draw iron studies with next visit.   The patient will follow up in one month.

## 2025-04-01 NOTE — REVIEW OF SYSTEMS
[Fatigue] : fatigue [FreeTextEntry2] : energy improving [FreeTextEntry7] : nausea intermittent/controlled, intermittent soft-loose stool [de-identified] : R>L hand tremor x 2 weeks resolved [Diarrhea: Grade 0] : Diarrhea: Grade 0 [Negative] : Heme/Lymph [Fever] : no fever [Chills] : no chills [Dysphagia] : no dysphagia [Nosebleeds] : no nosebleeds [Hoarseness] : no hoarseness [Odynophagia] : no odynophagia [Mucosal Pain] : no mucosal pain [Shortness Of Breath] : no shortness of breath [Wheezing] : no wheezing [Abdominal Pain] : no abdominal pain [Vomiting] : no vomiting [Constipation] : no constipation [Dizziness] : no dizziness [Fainting] : no fainting [Easy Bruising] : no tendency for easy bruising [FreeTextEntry4] : rhinitis with clear mucus x1 week (mild) [FreeTextEntry6] : nonproductive cough x1 wk (mild)

## 2025-04-01 NOTE — REVIEW OF SYSTEMS
[Fatigue] : fatigue [FreeTextEntry2] : energy improving [FreeTextEntry7] : nausea intermittent/controlled, intermittent soft-loose stool [de-identified] : R>L hand tremor x 2 weeks resolved [Diarrhea: Grade 0] : Diarrhea: Grade 0 [Negative] : Heme/Lymph [Fever] : no fever [Chills] : no chills [Dysphagia] : no dysphagia [Nosebleeds] : no nosebleeds [Hoarseness] : no hoarseness [Odynophagia] : no odynophagia [Mucosal Pain] : no mucosal pain [Shortness Of Breath] : no shortness of breath [Wheezing] : no wheezing [Abdominal Pain] : no abdominal pain [Vomiting] : no vomiting [Constipation] : no constipation [Dizziness] : no dizziness [Fainting] : no fainting [Easy Bruising] : no tendency for easy bruising [FreeTextEntry4] : rhinitis with clear mucus x1 week (mild) [FreeTextEntry6] : nonproductive cough x1 wk (mild)

## 2025-04-01 NOTE — ASSESSMENT
[FreeTextEntry1] : 66 yo female s/p auto-transplant for multiple myeloma  Day +695 Disease: Multiple Myeloma  Status post-transplant: Incomplete evaluation to determine Post transplant therapy: revlimid 10mg (at this time not planned for [place]) Indication: Maintenance   Engraftment: ANC engraftment date: 1/7/25 Platelet engraftment date: 1/15/25    PLAN: Disease monitoring  - Repeat multiple myeloma panel days +60, +100, +180 and per primary heme - RTC with Dr. Goetz for further monitoring/maintenance       - Pt planned to initiate Revlimid 10mg 3wk on/1wk off in coming weeks       - Consider restaging imaging +/- biopsy   Engraftment - G-CSF support: no - Transfusion requirements: no   ID - Prophylaxis: ------PCP: Bactrim SS On sulfa-trim: yes; cont until +6 mo post transplant ------HSV and VZV: acyclovir 800 mg; to cont until 4 wk post shingrix #2 (or per primary heme pending tx) - CMV PCR monitoring q visit until day +180, no reactivation to date post transplant - Hx +HBC, seen by transplant ID pre transplant (no ppx initiated) ------Monitor hep B DNA Quant PCR monthly; last negative on 2/18/25   - Vaccinations -----Eligible at day +180; plan to initiate next visit 6/26/25, schedule discussed with pt today -----High dose influenza annually, eligible at day +90; administered in clinic today 4/1/25 -----COVID per CDC guidelines for immunocompromised patients, advised to obtain locally   Follow up: 6/26 labs, provider visit, initiate vaccines; plan to RTC for vaccines only thereafter Cont to follow with Dr. Goetz, BMT team remains available for transplant related questions/concerns   ------------------------------------------------------------------------------------------------------ Patient was seen and examined with Jina ESPOSITO-BC  Nory Foss MD Adult Transplantation and Cellular Therapy Program [facility]

## 2025-04-01 NOTE — HISTORY OF PRESENT ILLNESS
[de-identified] :  64 yo female s/p auto-HSCT for myeloma   Status post auto transplant, day +95 (12/27/24) Transplant physician: Dr. Foss Referring physician: Dr. Goetz Diagnosis: Multiple Myeloma Disease staging at transplant: [facility]GPR Prognostic features:  Conditioning regimen: Melphalan 200    ARI BARRIENTOS is a 65 y.o. 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 She would notice after certain movements, then would seem to resolve. Then on 6/29/24 she was playing on the floor with her puppy and developed acute severe upper back pains. On 7/2/24 she saw her PCP Dr. Gastelum - Was felt to have muscle spasms but was sent for labs and an x-ray. Upon dx WBC: 12.59, Hgb: 7.7, Hct: 31.2, MCV: 73.2, Plts: 500 Ferritin: 4, TSAT: 5% SPEP: M-spike 1.7 SIFE: IgG Kappa monoclonal band present Serum light chains: Kappa: 592, Lambda: 11.2, K/L FLC Ratio: 50.63 No QIG's available, No CMP.  Daughter reports had spine X-ray ([facility]) that showed that showed a T7 and T8 compression fracture.   Patient recall being told she had AVM's about 4 years ago. Had anemia - was sent to GI for an evaluation (Dr. Mian Jaimes) and she reports she had a colonoscopy where AVM's were seen and cauterized. States she has had a few subsequent episodes of anemia - would be sent back to GI and have another procedure. Then she would be instructed to take PO iron for a short period of time after and her anemia would resolve. She has never had a blood transfusion or IV iron. She denies a personal or family hx of bleeding or clotting disorders.She reports she just saw Dr. Villanueva 7/9/24 - was instructed to start on PO iron, folic acid, and Vitamin C. Per daughter it sounds like he was considering a procedure but wanted to work around every else.  Patient does admit to chronic fatigue. She reports she has had pica for ice for a very long time. She denies any breathing issues but her daughter feels she has SOB and wheezing on occasion.  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  --------------------Surgical Hx: ------------------------  ---------Oncologic Hx:------------  Multiple Myeloma IgG kappa  ---------Treatment Hx------------- Status post 3 cycles of RVD, bortezomib, lenalidomide, and dexamethasone (VRd). Radiation treatment no specificy location, time and dates.  ---------Pathology Hx------------- Bone marrow biopsy 7/17/24- plasma cell myeloma, 18% by aspirate. Trilineage hematopoiesis, no iron stores. IHC  >10% but fragmented marrow. Cytogenetics- 51,X,-X,add(2)(p11.2),+3,+5,+5,idic(6)(q21),+7,+9,-13,+15,+19,add(20)(q13.1)[6]/46,XX[14] ABNORMAL FISH MULTIPLE MYELOMA PANEL - RB1 deletion detected (11%)   ---------Imaging Hx---------------- MRI thoracic spine without contrast 7/19/24- chronic compression fracture above T6. Abnormal marrow signal in T8 vertebral body which could represent a stress fracture. Bulging at T6-7. Cervical disc abnormalities at C4-5, C5-6.  PET/CT 7/24/24- severe compression deformity at T7 with increased uptake, SUV 6.2. Healing rib fracture at the anteromedial right third rib, SUV 3.5. 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  ---------Social:----------------------- -The patient was born in [place] and lives on [address] - Patient lives with her daughter and daughter's fiance. - Patient has older siblings.  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 - no smoker, no alcohol use, no other drug use  -------- Hospital Course:----------- Uncomplicated, mild mucositis      [de-identified] : 4/1/25: Presents for pre transplant f/u and flu shot today, day +95. Reports feeling well overall, good energy and appetite. Endorses rhinitis with clear mucus and nonproductive cough x1 week, now improving. Denies fevers or SOB throughout. Otherwise no new complaints. She has followed up with Dr. Goetz since last visit and planned to initiate maintenance revlimid 10mg upon delivery in coming ~week.  Denies headache, SOB, cough, N/V/D, bleeding, rash, pain. Meds reviewed.

## 2025-04-01 NOTE — REVIEW OF SYSTEMS
[Fatigue] : fatigue [FreeTextEntry2] : energy improving [FreeTextEntry7] : nausea intermittent/controlled, intermittent soft-loose stool [de-identified] : R>L hand tremor x 2 weeks resolved [Diarrhea: Grade 0] : Diarrhea: Grade 0 [Negative] : Heme/Lymph [Fever] : no fever [Chills] : no chills [Dysphagia] : no dysphagia [Nosebleeds] : no nosebleeds [Hoarseness] : no hoarseness [Odynophagia] : no odynophagia [Mucosal Pain] : no mucosal pain [Shortness Of Breath] : no shortness of breath [Wheezing] : no wheezing [Abdominal Pain] : no abdominal pain [Vomiting] : no vomiting [Constipation] : no constipation [Dizziness] : no dizziness [Fainting] : no fainting [Easy Bruising] : no tendency for easy bruising [FreeTextEntry4] : rhinitis with clear mucus x1 week (mild) [FreeTextEntry6] : nonproductive cough x1 wk (mild)

## 2025-04-01 NOTE — ASSESSMENT
[FreeTextEntry1] : 66 yo female s/p auto-transplant for multiple myeloma  Day +695 Disease: Multiple Myeloma  Status post-transplant: Incomplete evaluation to determine Post transplant therapy: revlimid 10mg (at this time not planned for [place]) Indication: Maintenance   Engraftment: ANC engraftment date: 1/7/25 Platelet engraftment date: 1/15/25    PLAN: Disease monitoring  - Repeat multiple myeloma panel days +60, +100, +180 and per primary heme - RTC with Dr. Goetz for further monitoring/maintenance       - Pt planned to initiate Revlimid 10mg 3wk on/1wk off in coming weeks       - Consider restaging imaging +/- biopsy   Engraftment - G-CSF support: no - Transfusion requirements: no   ID - Prophylaxis: ------PCP: Bactrim SS On sulfa-trim: yes; cont until +6 mo post transplant ------HSV and VZV: acyclovir 800 mg; to cont until 4 wk post shingrix #2 (or per primary heme pending tx) - CMV PCR monitoring q visit until day +180, no reactivation to date post transplant - Hx +HBC, seen by transplant ID pre transplant (no ppx initiated) ------Monitor hep B DNA Quant PCR monthly; last negative on 2/18/25   - Vaccinations -----Eligible at day +180; plan to initiate next visit 6/26/25, schedule discussed with pt today -----High dose influenza annually, eligible at day +90; administered in clinic today 4/1/25 -----COVID per CDC guidelines for immunocompromised patients, advised to obtain locally   Follow up: 6/26 labs, provider visit, initiate vaccines; plan to RTC for vaccines only thereafter Cont to follow with Dr. Goetz, BMT team remains available for transplant related questions/concerns   ------------------------------------------------------------------------------------------------------ Patient was seen and examined with Jina ESPOSITO-BC  Nory Foss MD Adult Transplantation and Cellular Therapy Program [facility]

## 2025-04-01 NOTE — PHYSICAL EXAM
[Restricted in physically strenuous activity but ambulatory and able to carry out work of a light or sedentary nature] : Status 1- Restricted in physically strenuous activity but ambulatory and able to carry out work of a light or sedentary nature, e.g., light house work, office work [de-identified] : cath removed [de-identified] : generalized weakness, kyphosis improved [de-identified] : R hand intention tremor noted [Normal] : affect appropriate

## 2025-04-01 NOTE — PHYSICAL EXAM
[Restricted in physically strenuous activity but ambulatory and able to carry out work of a light or sedentary nature] : Status 1- Restricted in physically strenuous activity but ambulatory and able to carry out work of a light or sedentary nature, e.g., light house work, office work [de-identified] : cath removed [de-identified] : generalized weakness, kyphosis improved [de-identified] : R hand intention tremor noted [Normal] : affect appropriate

## 2025-04-01 NOTE — HISTORY OF PRESENT ILLNESS
[de-identified] :  66 yo female s/p auto-HSCT for myeloma   Status post auto transplant, day +95 (12/27/24) Transplant physician: Dr. Foss Referring physician: Dr. Goetz Diagnosis: Multiple Myeloma Disease staging at transplant: VGPR Prognostic features:  Conditioning regimen: Melphalan 200    ARI BARRIENTOS is a 65 y.o. 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 She would notice after certain movements, then would seem to resolve. Then on 6/29/24 she was playing on the floor with her puppy and developed acute severe upper back pains. On 7/2/24 she saw her PCP Dr. Gastelum - Was felt to have muscle spasms but was sent for labs and an x-ray. Upon dx WBC: 12.59, Hgb: 7.7, Hct: 31.2, MCV: 73.2, Plts: 500 Ferritin: 4, TSAT: 5% SPEP: M-spike 1.7 SIFE: IgG Kappa monoclonal band present Serum light chains: Kappa: 592, Lambda: 11.2, K/L FLC Ratio: 50.63 No QIG's available, No CMP.  Daughter reports had spine X-ray ([facility]) that showed that showed a T7 and T8 compression fracture.   Patient recall being told she had AVM's about 4 years ago. Had anemia - was sent to GI for an evaluation (Dr. Mian Jaimes) and she reports she had a colonoscopy where AVM's were seen and cauterized. States she has had a few subsequent episodes of anemia - would be sent back to GI and have another procedure. Then she would be instructed to take PO iron for a short period of time after and her anemia would resolve. She has never had a blood transfusion or IV iron. She denies a personal or family hx of bleeding or clotting disorders.She reports she just saw Dr. Villanueva 7/9/24 - was instructed to start on PO iron, folic acid, and Vitamin C. Per daughter it sounds like he was considering a procedure but wanted to work around every else.  Patient does admit to chronic fatigue. She reports she has had pica for ice for a very long time. She denies any breathing issues but her daughter feels she has SOB and wheezing on occasion.  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  --------------------Surgical Hx: ------------------------  ---------Oncologic Hx:------------  Multiple Myeloma IgG kappa  ---------Treatment Hx------------- Status post 3 cycles of RVD, bortezomib, lenalidomide, and dexamethasone (VRd). Radiation treatment no specificy location, time and dates.  ---------Pathology Hx------------- Bone marrow biopsy 7/17/24- plasma cell myeloma, 18% by aspirate. Trilineage hematopoiesis, no iron stores. IHC  >10% but fragmented marrow. Cytogenetics- 51,X,-X,add(2)(p11.2),+3,+5,+5,idic(6)(q21),+7,+9,-13,+15,+19,add(20)(q13.1)[6]/46,XX[14] ABNORMAL FISH MULTIPLE MYELOMA PANEL - RB1 deletion detected (11%)   ---------Imaging Hx---------------- MRI thoracic spine without contrast 7/19/24- chronic compression fracture above T6. Abnormal marrow signal in T8 vertebral body which could represent a stress fracture. Bulging at T6-7. Cervical disc abnormalities at C4-5, C5-6.  PET/CT 7/24/24- severe compression deformity at T7 with increased uptake, SUV 6.2. Healing rib fracture at the anteromedial right third rib, SUV 3.5. 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  ---------Social:----------------------- -The patient was born in [place] and lives on [address] - Patient lives with her daughter and daughter's fiance. - Patient has older siblings.  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 - no smoker, no alcohol use, no other drug use  -------- Hospital Course:----------- Uncomplicated, mild mucositis      [de-identified] : 4/1/25: Presents for pre transplant f/u and flu shot today, day +95. Reports feeling well overall, good energy and appetite. Endorses rhinitis with clear mucus and nonproductive cough x1 week, now improving. Denies fevers or SOB throughout. Otherwise no new complaints. She has followed up with Dr. Goetz since last visit and planned to initiate maintenance revlimid 10mg upon delivery in coming ~week.  Denies headache, SOB, cough, N/V/D, bleeding, rash, pain. Meds reviewed.

## 2025-04-01 NOTE — ASSESSMENT
[FreeTextEntry1] : 66 yo female s/p auto-transplant for multiple myeloma  Day +695 Disease: Multiple Myeloma  Status post-transplant: Incomplete evaluation to determine Post transplant therapy: revlimid 10mg (at this time not planned for [place]) Indication: Maintenance   Engraftment: ANC engraftment date: 1/7/25 Platelet engraftment date: 1/15/25    PLAN: Disease monitoring  - Repeat multiple myeloma panel days +60, +100, +180 and per primary heme - RTC with Dr. Goetz for further monitoring/maintenance       - Pt planned to initiate Revlimid 10mg 3wk on/1wk off in coming weeks       - Consider restaging imaging +/- biopsy   Engraftment - G-CSF support: no - Transfusion requirements: no   ID - Prophylaxis: ------PCP: Bactrim SS On sulfa-trim: yes; cont until +6 mo post transplant ------HSV and VZV: acyclovir 800 mg; to cont until 4 wk post shingrix #2 (or per primary heme pending tx) - CMV PCR monitoring q visit until day +180, no reactivation to date post transplant - Hx +HBC, seen by transplant ID pre transplant (no ppx initiated) ------Monitor hep B DNA Quant PCR monthly; last negative on 2/18/25   - Vaccinations -----Eligible at day +180; plan to initiate next visit 6/26/25, schedule discussed with pt today -----High dose influenza annually, eligible at day +90; administered in clinic today 4/1/25 -----COVID per CDC guidelines for immunocompromised patients, advised to obtain locally   Follow up: 6/26 labs, provider visit, initiate vaccines; plan to RTC for vaccines only thereafter Cont to follow with Dr. Goetz, BMT team remains available for transplant related questions/concerns   ------------------------------------------------------------------------------------------------------ Patient was seen and examined with Jina ESPOSITO-BC  Nory Foss MD Adult Transplantation and Cellular Therapy Program [facility]

## 2025-04-01 NOTE — HISTORY OF PRESENT ILLNESS
[de-identified] :  66 yo female s/p auto-HSCT for myeloma   Status post auto transplant, day +95 (12/27/24) Transplant physician: Dr. Foss Referring physician: Dr. Goetz Diagnosis: Multiple Myeloma Disease staging at transplant: VGPR Prognostic features:  Conditioning regimen: Melphalan 200    ARI BARRIENTOS is a 65 y.o. 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 She would notice after certain movements, then would seem to resolve. Then on 6/29/24 she was playing on the floor with her puppy and developed acute severe upper back pains. On 7/2/24 she saw her PCP Dr. Gastelum - Was felt to have muscle spasms but was sent for labs and an x-ray. Upon dx WBC: 12.59, Hgb: 7.7, Hct: 31.2, MCV: 73.2, Plts: 500 Ferritin: 4, TSAT: 5% SPEP: M-spike 1.7 SIFE: IgG Kappa monoclonal band present Serum light chains: Kappa: 592, Lambda: 11.2, K/L FLC Ratio: 50.63 No QIG's available, No CMP.  Daughter reports had spine X-ray ([facility]) that showed that showed a T7 and T8 compression fracture.   Patient recall being told she had AVM's about 4 years ago. Had anemia - was sent to GI for an evaluation (Dr. Mian Jaimes) and she reports she had a colonoscopy where AVM's were seen and cauterized. States she has had a few subsequent episodes of anemia - would be sent back to GI and have another procedure. Then she would be instructed to take PO iron for a short period of time after and her anemia would resolve. She has never had a blood transfusion or IV iron. She denies a personal or family hx of bleeding or clotting disorders.She reports she just saw Dr. Villanueva 7/9/24 - was instructed to start on PO iron, folic acid, and Vitamin C. Per daughter it sounds like he was considering a procedure but wanted to work around every else.  Patient does admit to chronic fatigue. She reports she has had pica for ice for a very long time. She denies any breathing issues but her daughter feels she has SOB and wheezing on occasion.  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  --------------------Surgical Hx: ------------------------  ---------Oncologic Hx:------------  Multiple Myeloma IgG kappa  ---------Treatment Hx------------- Status post 3 cycles of RVD, bortezomib, lenalidomide, and dexamethasone (VRd). Radiation treatment no specificy location, time and dates.  ---------Pathology Hx------------- Bone marrow biopsy 7/17/24- plasma cell myeloma, 18% by aspirate. Trilineage hematopoiesis, no iron stores. IHC  >10% but fragmented marrow. Cytogenetics- 51,X,-X,add(2)(p11.2),+3,+5,+5,idic(6)(q21),+7,+9,-13,+15,+19,add(20)(q13.1)[6]/46,XX[14] ABNORMAL FISH MULTIPLE MYELOMA PANEL - RB1 deletion detected (11%)   ---------Imaging Hx---------------- MRI thoracic spine without contrast 7/19/24- chronic compression fracture above T6. Abnormal marrow signal in T8 vertebral body which could represent a stress fracture. Bulging at T6-7. Cervical disc abnormalities at C4-5, C5-6.  PET/CT 7/24/24- severe compression deformity at T7 with increased uptake, SUV 6.2. Healing rib fracture at the anteromedial right third rib, SUV 3.5. 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  ---------Social:----------------------- -The patient was born in [place] and lives on [address] - Patient lives with her daughter and daughter's fiance. - Patient has older siblings.  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 - no smoker, no alcohol use, no other drug use  -------- Hospital Course:----------- Uncomplicated, mild mucositis      [de-identified] : 4/1/25: Presents for pre transplant f/u and flu shot today, day +95. Reports feeling well overall, good energy and appetite. Endorses rhinitis with clear mucus and nonproductive cough x1 week, now improving. Denies fevers or SOB throughout. Otherwise no new complaints. She has followed up with Dr. Goetz since last visit and planned to initiate maintenance revlimid 10mg upon delivery in coming ~week.  Denies headache, SOB, cough, N/V/D, bleeding, rash, pain. Meds reviewed.

## 2025-04-01 NOTE — PHYSICAL EXAM
[Restricted in physically strenuous activity but ambulatory and able to carry out work of a light or sedentary nature] : Status 1- Restricted in physically strenuous activity but ambulatory and able to carry out work of a light or sedentary nature, e.g., light house work, office work [de-identified] : cath removed [de-identified] : generalized weakness, kyphosis improved [de-identified] : R hand intention tremor noted [Normal] : affect appropriate

## 2025-04-01 NOTE — REASON FOR VISIT
76 [Follow-Up Visit] : a follow-up visit for [Family Member] : family member [FreeTextEntry2] : Multiple Myeloma status post AUTO PBSCT

## 2025-05-02 NOTE — PHYSICAL EXAM
[Obese] : obese [Normal] : affect appropriate [de-identified] : anicteric [de-identified] : no c/c/e [de-identified] : no rashes

## 2025-05-02 NOTE — HISTORY OF PRESENT ILLNESS
[de-identified] : IgG kappa multiple myeloma Iron deficiency anemia/colonic AVM  ARI BARRIENTOS is a 65 y.o. 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   Patient reports ~ 6 month hx of intermittent right flank rib pains.  Would notice after certain movements, then would seem to resolve.   Then on 6/29/24 she was playing on the floor with her puppy and developed acute severe upper back pains.  7/2/24 - Saw PCP Dr. Gastelum - Was felt to have muscle spasms but was sent for labs and an x-ray.   WBC: 12.59, Hgb: 7.7, Hct: 31.2, MCV: 73.2, Plts: 500 Ferritin: 4, TSAT: 5% SPEP: M-spike 1.7 SIFE: IgG Kappa monoclonal band present Serum light chains: Kappa: 592, Lambda: 11.2, K/L FLC Ratio: 50.63 No QIG's available, No CMP.   Daughter reports had spine X-ray ([facility]) that showed that showed a T7 and T8 compression fracture. (Daughter is a physical therapist and somewhat familiar with these medical terms). Patient has been taking Percocet, but it is barely just getting the edge off.  She has an appointment with a spine doctor later today.   Patient recall being told she had AVM's about 4 years ago.  Had anemia - was sent to GI for an evaluation (Dr. Mian Jaimes) and she reports she had a colonoscopy where AVM's were seen and cauterized.   States she has had a few subsequent episodes of anemia - would be sent back to GI and have another procedure.  Then she would be instructed to take PO iron for a short period of time after and her anemia would resolve.  She has never had a blood transfusion or IV iron. She denies a personal or family hx of bleeding or clotting disorders.  She reports she just saw Dr. Villanueva 7/9/24 - was instructed to start on PO iron, folic acid, and Vitamin C. Per daughter it sounds like he was considering a procedure but wanted to work around every else.  Patient does admit to chronic fatigue. She reports she has had pica for ice for a very long time.  She denies any breathing issues but her daughter feels she has SOB and wheezing on occasion.    MRI thoracic spine without contrast 7/19/24- chronic compression fracture above T6.  Abnormal marrow signal in T8 vertebral body which could represent a stress fracture.  Bulging at T6-7.  Cervical disc abnormalities at C4-5, C5-6.  PET/CT 7/24/24- severe compression deformity at T7 with increased uptake, SUV 6.2.  Healing rib fracture at the anteromedial right third rib, SUV 3.5.  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  Bone marrow biopsy 7/17/24- plasma cell myeloma, 18% by aspirate.  Trilineage hematopoiesis, no iron stores.  IHC  >10% but fragmented marrow.  Cytogenetics- 51,X,-X,add(2)(p11.2),+3,+5,+5,idic(6)(q21),+7,+9,-13,+15,+19,add(20)(q13.1)[6]/46,XX[14] ABNORMAL FISH MULTIPLE MYELOMA PANEL - RB1 deletion detected (11%)  [de-identified] : S/p auto PSCT on 12/27/24, today is day +91 Flu vaccine and follow up with Dr. Foss next week Reports transplant process was difficult for her due to loss of appetite and nausea. Weight loss through diagnosis and treatment process but now eating more and weight stabilizing  Still has some occasional back discomfort, not taking any pain medications.  No fevers, chills, no LAD, no night sweats.

## 2025-05-02 NOTE — REASON FOR VISIT
"Chief Complaint   Patient presents with     Consult     new consult for headaches, constipation, and thyroid issues       Initial BP 93/59 (BP Location: Left arm, Patient Position: Sitting, Cuff Size: Adult Small)  Pulse 85  Resp 20  Ht 4' 2.98\" (129.5 cm)  Wt 50 lb 7.8 oz (22.9 kg)  BMI 13.66 kg/m2 Estimated body mass index is 13.66 kg/(m^2) as calculated from the following:    Height as of this encounter: 4' 2.98\" (129.5 cm).    Weight as of this encounter: 50 lb 7.8 oz (22.9 kg).  Medication Reconciliation: complete   Sejal Solis LPN      " [Follow-Up Visit] : a follow-up visit for [Other: _____] : [unfilled] [FreeTextEntry2] : Multiple myeloma/JUAN LUIS

## 2025-05-02 NOTE — REVIEW OF SYSTEMS
[Patient Intake Form Reviewed] : Patient intake form was reviewed [Fever] : no fever [Chills] : no chills [Night Sweats] : no night sweats [Fatigue] : no fatigue [Chest Pain] : no chest pain [Shortness Of Breath] : no shortness of breath [SOB on Exertion] : no shortness of breath during exertion [Abdominal Pain] : no abdominal pain [Vomiting] : no vomiting [Constipation] : no constipation [Diarrhea: Grade 0] : Diarrhea: Grade 0 [Negative] : Heme/Lymph [FreeTextEntry3] : eye redness [FreeTextEntry9] : Occasional back pain

## 2025-05-02 NOTE — ASSESSMENT
[FreeTextEntry1] : Multiple myeloma. ISS-1.  Standard risk.   This is a 65 year old female who was found to have multiple compression fractures, work up with IgG kappa multiple myeloma along with iron deficiency anemia. She is no s/p MM tx with VRD, and auto stem cell transplant on 12/27/25.  7/3/24- Xray mild compression deformities of T7 and T8.   Labs on 7/12/24- Hg 7.7 Creatinine 0.88, calcium 9.2 total protein/albumin 8.5/4.0 SPEP- M spike 2.1, IgG 2789, kappa FLC 54.96/lambda FLC 0.85, ratio 64.66 7/5- Iron 29/sat 5/TIBC 528/ferritin 4  Iron deficiency/history of colonic AVM-  Given 4 doses of feraheme in July. Her Hg has since normalized.  Work up has confirmed multiple myeloma. ISS-1.  Standard risk.  MRI thoracic spine without contrast 7/19/24- chronic compression fracture above T6. Abnormal marrow signal in T8 vertebral body which could represent a stress fracture. Bulging at T6-7. Cervical disc abnormalities at C4-5, C5-6.  PET/CT 7/24/24- severe compression deformity at T7 with increased uptake, SUV 6.2. Healing rib fracture at the anteromedial right third rib, SUV 3.5. 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  Bone marrow biopsy 7/17/24- plasma cell myeloma, 18% by aspirate. Trilineage hematopoiesis, no iron stores. IHC  >10% but fragmented marrow. Cytogenetics- 51,X,-X,add(2)(p11.2),+3,+5,+5,idic(6)(q21),+7,+9,-13,+15,+19,add(20)(q13.1)[6]/46,XX[14] ABNORMAL FISH MULTIPLE MYELOMA PANEL - RB1 deletion detected (11%).  Started on RVD, bortezomib, lenalidomide, and dexamethasone (VRd). Velcade 1.3mg/m2 SQ weekly x 3, 1 week off.  Revlimid 25mg/day, days 1-21 every 28 days. Dexamethasone 20mg weekly.  Started on 8/9/24.  Currently cycle 4, day 15.   7/12/24- M spike 2.1, IgG 2789, k/l ratio 64.66 9/19/24- M spike 0.3, IgG 654, k/l ratio 1.35 10/17/24- M spike 0.1, IgG 590, k/l ratio 1.45 11/15/24- IgG 654, k/l FLC ratio    In [facility] - bone marrow biopsy 11/19/24- normocellular marrow with trilineage hematopoiesis, no evidence of plasma cell neoplasm.   Patient had an autologous stem cell transplant on 12/27/25.  Today is Day +91.  Thromboprophylaxis with aspirin daily (taking 325mg).   Antiviral prophylaxis with acyclovir 800mg twice a day.  PCP ppx with bactrim SS daily.  Zofran as needed for nausea. Bowel regimen for constipation.  Patient with mild back pain not requiring narcotics.   Plan for 10 mg Revlimid maintenance 10 weeks on one week off once she meets with Dr. Foss next week and is cleared to start (~Day +100). Will order after she sees Dr. Foss. Will continue aspirin daily with Revlimid maintenance Monitor hepatitis B core PCR monthly per transplant  CBC today - WBC 8.16, HGB 13.5, . CMP, immunoelectrophoresis drawn at today's visit. Draw iron studies with next visit.   The patient will follow up in one month.   [facility] Patient ID# [id number]

## 2025-06-27 NOTE — HISTORY OF PRESENT ILLNESS
[de-identified] :  64 yo female s/p auto-HSCT for myeloma   Status post auto transplant, day +181 (12/27/24) Transplant physician: Dr. Foss Referring physician: Dr. Goetz Diagnosis: Multiple Myeloma Disease staging at transplant: [facility] Prognostic features:  Conditioning regimen: Melphalan 200    ARI BARRIENTOS is a 65 y.o. 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 She would notice after certain movements, then would seem to resolve. Then on 6/29/24 she was playing on the floor with her puppy and developed acute severe upper back pains. On 7/2/24 she saw her PCP Dr. Gastelum - Was felt to have muscle spasms but was sent for labs and an x-ray. Upon dx WBC: 12.59, Hgb: 7.7, Hct: 31.2, MCV: 73.2, Plts: 500 Ferritin: 4, TSAT: 5% SPEP: M-spike 1.7 SIFE: IgG Kappa monoclonal band present Serum light chains: Kappa: 592, Lambda: 11.2, K/L FLC Ratio: 50.63 No QIG's available, No CMP.  Daughter reports had spine X-ray ([facility]) that showed that showed a T7 and T8 compression fracture.   Patient recall being told she had AVM's about 4 years ago. Had anemia - was sent to GI for an evaluation (Dr. Mian Jaimes) and she reports she had a colonoscopy where AVM's were seen and cauterized. States she has had a few subsequent episodes of anemia - would be sent back to GI and have another procedure. Then she would be instructed to take PO iron for a short period of time after and her anemia would resolve. She has never had a blood transfusion or IV iron. She denies a personal or family hx of bleeding or clotting disorders.She reports she just saw Dr. Villanueva 7/9/24 - was instructed to start on PO iron, folic acid, and Vitamin C. Per daughter it sounds like he was considering a procedure but wanted to work around every else.  Patient does admit to chronic fatigue. She reports she has had pica for ice for a very long time. She denies any breathing issues but her daughter feels she has SOB and wheezing on occasion.  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  --------------------Surgical Hx: ------------------------  ---------Oncologic Hx:------------  Multiple Myeloma IgG kappa  ---------Treatment Hx------------- Status post 3 cycles of RVD, bortezomib, lenalidomide, and dexamethasone (VRd). Radiation treatment no specificy location, time and dates.  ---------Pathology Hx------------- Bone marrow biopsy 7/17/24- plasma cell myeloma, 18% by aspirate. Trilineage hematopoiesis, no iron stores. IHC  >10% but fragmented marrow. Cytogenetics- 51,X,-X,add(2)(p11.2),+3,+5,+5,idic(6)(q21),+7,+9,-13,+15,+19,add(20)(q13.1)[6]/46,XX[14] ABNORMAL FISH MULTIPLE MYELOMA PANEL - RB1 deletion detected (11%)   ---------Imaging Hx---------------- MRI thoracic spine without contrast 7/19/24- chronic compression fracture above T6. Abnormal marrow signal in T8 vertebral body which could represent a stress fracture. Bulging at T6-7. Cervical disc abnormalities at C4-5, C5-6.  PET/CT 7/24/24- severe compression deformity at T7 with increased uptake, SUV 6.2. Healing rib fracture at the anteromedial right third rib, SUV 3.5. 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  ---------Social:----------------------- -The patient was born in [place] and lives on [address] - Patient lives with her daughter and daughter's fiance. - Patient has older siblings.  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 - no smoker, no alcohol use, no other drug use  -------- Hospital Course:----------- Uncomplicated, mild mucositis      [de-identified] : 6/25/25: Presents today for day +180 visit and BMT vaccinations. She is doing well with no complaints aside from chronic back discomfort..asks about kyphoplasty.... She has followed up with Dr. Goetz since last visit and maintained on revlimid.  Denies headache, SOB, cough, N/V/D, bleeding, rash, pain. Meds reviewed.

## 2025-06-27 NOTE — REVIEW OF SYSTEMS
[Diarrhea: Grade 0] : Diarrhea: Grade 0 [Negative] : Heme/Lymph [Fever] : no fever [Chills] : no chills [Dysphagia] : no dysphagia [Nosebleeds] : no nosebleeds [Hoarseness] : no hoarseness [Odynophagia] : no odynophagia [Mucosal Pain] : no mucosal pain [Shortness Of Breath] : no shortness of breath [Wheezing] : no wheezing [Abdominal Pain] : no abdominal pain [Vomiting] : no vomiting [Constipation] : no constipation [Dizziness] : no dizziness [Fainting] : no fainting [Easy Bruising] : no tendency for easy bruising

## 2025-06-27 NOTE — HISTORY OF PRESENT ILLNESS
[de-identified] :  64 yo female s/p auto-HSCT for myeloma   Status post auto transplant, day +181 (12/27/24) Transplant physician: Dr. Foss Referring physician: Dr. Goetz Diagnosis: Multiple Myeloma Disease staging at transplant: [facility] Prognostic features:  Conditioning regimen: Melphalan 200    ARI BARRIENTOS is a 65 y.o. 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 She would notice after certain movements, then would seem to resolve. Then on 6/29/24 she was playing on the floor with her puppy and developed acute severe upper back pains. On 7/2/24 she saw her PCP Dr. Gastelum - Was felt to have muscle spasms but was sent for labs and an x-ray. Upon dx WBC: 12.59, Hgb: 7.7, Hct: 31.2, MCV: 73.2, Plts: 500 Ferritin: 4, TSAT: 5% SPEP: M-spike 1.7 SIFE: IgG Kappa monoclonal band present Serum light chains: Kappa: 592, Lambda: 11.2, K/L FLC Ratio: 50.63 No QIG's available, No CMP.  Daughter reports had spine X-ray ([facility]) that showed that showed a T7 and T8 compression fracture.   Patient recall being told she had AVM's about 4 years ago. Had anemia - was sent to GI for an evaluation (Dr. Mian Jaimes) and she reports she had a colonoscopy where AVM's were seen and cauterized. States she has had a few subsequent episodes of anemia - would be sent back to GI and have another procedure. Then she would be instructed to take PO iron for a short period of time after and her anemia would resolve. She has never had a blood transfusion or IV iron. She denies a personal or family hx of bleeding or clotting disorders.She reports she just saw Dr. Villanueva 7/9/24 - was instructed to start on PO iron, folic acid, and Vitamin C. Per daughter it sounds like he was considering a procedure but wanted to work around every else.  Patient does admit to chronic fatigue. She reports she has had pica for ice for a very long time. She denies any breathing issues but her daughter feels she has SOB and wheezing on occasion.  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  --------------------Surgical Hx: ------------------------  ---------Oncologic Hx:------------  Multiple Myeloma IgG kappa  ---------Treatment Hx------------- Status post 3 cycles of RVD, bortezomib, lenalidomide, and dexamethasone (VRd). Radiation treatment no specificy location, time and dates.  ---------Pathology Hx------------- Bone marrow biopsy 7/17/24- plasma cell myeloma, 18% by aspirate. Trilineage hematopoiesis, no iron stores. IHC  >10% but fragmented marrow. Cytogenetics- 51,X,-X,add(2)(p11.2),+3,+5,+5,idic(6)(q21),+7,+9,-13,+15,+19,add(20)(q13.1)[6]/46,XX[14] ABNORMAL FISH MULTIPLE MYELOMA PANEL - RB1 deletion detected (11%)   ---------Imaging Hx---------------- MRI thoracic spine without contrast 7/19/24- chronic compression fracture above T6. Abnormal marrow signal in T8 vertebral body which could represent a stress fracture. Bulging at T6-7. Cervical disc abnormalities at C4-5, C5-6.  PET/CT 7/24/24- severe compression deformity at T7 with increased uptake, SUV 6.2. Healing rib fracture at the anteromedial right third rib, SUV 3.5. 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  ---------Social:----------------------- -The patient was born in [place] and lives on [place] - Patient lives with her daughter and daughter's fiance. - Patient has older siblings.  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 - no smoker, no alcohol use, no other drug use  -------- Hospital Course:----------- Uncomplicated, mild mucositis      [de-identified] : 6/25/25: Presents today for day +180 visit and BMT vaccinations. She is doing well with no complaints aside from chronic back discomfort..asks about kyphoplasty.... She has followed up with Dr. Goetz since last visit and maintained on revlimid.  Denies headache, SOB, cough, N/V/D, bleeding, rash, pain. Meds reviewed.

## 2025-06-27 NOTE — ASSESSMENT
[FreeTextEntry1] : 64 yo female s/p auto-transplant for multiple myeloma  Day +180 Disease: Multiple Myeloma  Status post-transplant: Incomplete evaluation to determine..[facility] Post transplant therapy: revlimid 10mg (at this time not planned for [place]) Indication: Maintenance   Engraftment: ANC engraftment date: 1/7/25 Platelet engraftment date: 1/15/25    PLAN: Disease monitoring  - Repeat multiple myeloma panel days +60, +100, +180 and per primary heme - Continue to follow with Dr. Goetz for further monitoring/maintenance       - Pt planned to initiate Revlimid 10mg 3wk on/1wk off in coming weeks       - S/p PET scan in May 2025 with improvement   Engraftment - G-CSF support: no - Transfusion requirements: no   ID - Prophylaxis: ------PCP: Bactrim SS On sulfa-trim: yes; cont until +6 mo post transplant ------HSV and VZV: acyclovir 800 mg; to cont until 4 wk post shingrix #2 (or per primary heme pending tx) - CMV PCR monitoring q visit until day +180, no reactivation to date post transplant - Hx +HBC, seen by transplant ID pre transplant (no ppx initiated) ------Monitor hep B DNA Quant PCR monthly; last negative on 2/18/25   - Vaccinations -----Eligible at day +180; initiated today, 6/26/25 -----High dose influenza annually, eligible at day +90; administered in clinic today 4/1/25 -----COVID per CDC guidelines for immunocompromised patients, advised to obtain locally   Follow up: 4 weeks - vaccinations  Cont to follow with Dr. Goetz, BMT team remains available for transplant related questions/concerns   ------------------------------------------------------------------------------------------------------ Patient was seen and examined with Audrey ESPOSITO-BC  Nory Foss MD Adult Transplantation and Cellular Therapy Program [facility]

## 2025-06-27 NOTE — ASSESSMENT
[FreeTextEntry1] : 64 yo female s/p auto-transplant for multiple myeloma  Day +180 Disease: Multiple Myeloma  Status post-transplant: Incomplete evaluation to determine..[facility] Post transplant therapy: revlimid 10mg (at this time not planned for [place]) Indication: Maintenance   Engraftment: ANC engraftment date: 1/7/25 Platelet engraftment date: 1/15/25    PLAN: Disease monitoring  - Repeat multiple myeloma panel days +60, +100, +180 and per primary heme - Continue to follow with Dr. Goetz for further monitoring/maintenance       - Pt planned to initiate Revlimid 10mg 3wk on/1wk off in coming weeks       - S/p PET scan in May 2025 with improvement   Engraftment - G-CSF support: no - Transfusion requirements: no   ID - Prophylaxis: ------PCP: Bactrim SS On sulfa-trim: yes; cont until +6 mo post transplant ------HSV and VZV: acyclovir 800 mg; to cont until 4 wk post shingrix #2 (or per primary heme pending tx) - CMV PCR monitoring q visit until day +180, no reactivation to date post transplant - Hx +HBC, seen by transplant ID pre transplant (no ppx initiated) ------Monitor hep B DNA Quant PCR monthly; last negative on 2/18/25   - Vaccinations -----Eligible at day +180; initiated today, 6/26/25 -----High dose influenza annually, eligible at day +90; administered in clinic today 4/1/25 -----COVID per CDC guidelines for immunocompromised patients, advised to obtain locally   Follow up: 4 weeks - vaccinations  Cont to follow with Dr. Goetz, BMT team remains available for transplant related questions/concerns   ------------------------------------------------------------------------------------------------------ Patient was seen and examined with Audrey ESPOSITO-BC  Nory Foss MD Adult Transplantation and Cellular Therapy Program [facility]

## 2025-07-11 NOTE — REVIEW OF SYSTEMS
[Patient Intake Form Reviewed] : Patient intake form was reviewed [Fever] : no fever [Chills] : no chills [Night Sweats] : no night sweats [Fatigue] : fatigue [Chest Pain] : no chest pain [Shortness Of Breath] : no shortness of breath [SOB on Exertion] : no shortness of breath during exertion [Abdominal Pain] : no abdominal pain [Vomiting] : no vomiting [Constipation] : no constipation [Diarrhea: Grade 0] : Diarrhea: Grade 0 [Negative] : Heme/Lymph [FreeTextEntry9] : Occasional back pain

## 2025-07-11 NOTE — HISTORY OF PRESENT ILLNESS
[de-identified] : IgG kappa multiple myeloma Iron deficiency anemia/colonic AVM  ARI BARRIENTOS is a 65 y.o. 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   Patient reports ~ 6 month hx of intermittent right flank rib pains.  Would notice after certain movements, then would seem to resolve.   Then on 6/29/24 she was playing on the floor with her puppy and developed acute severe upper back pains.  7/2/24 - Saw PCP Dr. Gastelum - Was felt to have muscle spasms but was sent for labs and an x-ray.   WBC: 12.59, Hgb: 7.7, Hct: 31.2, MCV: 73.2, Plts: 500 Ferritin: 4, TSAT: 5% SPEP: M-spike 1.7 SIFE: IgG Kappa monoclonal band present Serum light chains: Kappa: 592, Lambda: 11.2, K/L FLC Ratio: 50.63 No QIG's available, No CMP.   Daughter reports had spine X-ray ([facility]) that showed that showed a T7 and T8 compression fracture. (Daughter is a physical therapist and somewhat familiar with these medical terms). Patient has been taking Percocet, but it is barely just getting the edge off.  She has an appointment with a spine doctor later today.   Patient recall being told she had AVM's about 4 years ago.  Had anemia - was sent to GI for an evaluation (Dr. Mian Jaimes) and she reports she had a colonoscopy where AVM's were seen and cauterized.   States she has had a few subsequent episodes of anemia - would be sent back to GI and have another procedure.  Then she would be instructed to take PO iron for a short period of time after and her anemia would resolve.  She has never had a blood transfusion or IV iron. She denies a personal or family hx of bleeding or clotting disorders.  She reports she just saw Dr. Villanueva 7/9/24 - was instructed to start on PO iron, folic acid, and Vitamin C. Per daughter it sounds like he was considering a procedure but wanted to work around every else.  Patient does admit to chronic fatigue. She reports she has had pica for ice for a very long time.  She denies any breathing issues but her daughter feels she has SOB and wheezing on occasion.    MRI thoracic spine without contrast 7/19/24- chronic compression fracture above T6.  Abnormal marrow signal in T8 vertebral body which could represent a stress fracture.  Bulging at T6-7.  Cervical disc abnormalities at C4-5, C5-6.  PET/CT 7/24/24- severe compression deformity at T7 with increased uptake, SUV 6.2.  Healing rib fracture at the anteromedial right third rib, SUV 3.5.  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  Bone marrow biopsy 7/17/24- plasma cell myeloma, 18% by aspirate.  Trilineage hematopoiesis, no iron stores.  IHC  >10% but fragmented marrow.  Cytogenetics- 51,X,-X,add(2)(p11.2),+3,+5,+5,idic(6)(q21),+7,+9,-13,+15,+19,add(20)(q13.1)[6]/46,XX[14] ABNORMAL FISH MULTIPLE MYELOMA PANEL - RB1 deletion detected (11%)  [de-identified] : Complaining of some lower back, right scapular, right rib discomfort last weekend for a few weeks.  Worsened after lifting heavy objects. Told by ortho due to her right rib lesion ? Otherwise she is feeling well.  Started receiving her vaccinations with Dr. Foss.

## 2025-07-11 NOTE — ASSESSMENT
[FreeTextEntry1] : Multiple myeloma. ISS-1.  Standard risk.   This is a 65 year old female who was found to have multiple compression fractures, work up with IgG kappa multiple myeloma along with iron deficiency anemia. She is no s/p MM tx with VRD, and auto stem cell transplant on 12/27/25.  7/3/24- Xray mild compression deformities of T7 and T8.   Labs on 7/12/24- Hg 7.7 Creatinine 0.88, calcium 9.2 total protein/albumin 8.5/4.0 SPEP- M spike 2.1, IgG 2789, kappa FLC 54.96/lambda FLC 0.85, ratio 64.66 7/5- Iron 29/sat 5/TIBC 528/ferritin 4  Iron deficiency/history of colonic AVM-  Given 4 doses of feraheme in July. Her Hg has since normalized.  Work up has confirmed multiple myeloma. ISS-1.  Standard risk.  MRI thoracic spine without contrast 7/19/24- chronic compression fracture above T6. Abnormal marrow signal in T8 vertebral body which could represent a stress fracture. Bulging at T6-7. Cervical disc abnormalities at C4-5, C5-6.  PET/CT 7/24/24- severe compression deformity at T7 with increased uptake, SUV 6.2. Healing rib fracture at the anteromedial right third rib, SUV 3.5. 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  Bone marrow biopsy 7/17/24- plasma cell myeloma, 18% by aspirate. Trilineage hematopoiesis, no iron stores. IHC  >10% but fragmented marrow. Cytogenetics- 51,X,-X,add(2)(p11.2),+3,+5,+5,idic(6)(q21),+7,+9,-13,+15,+19,add(20)(q13.1)[6]/46,XX[14] ABNORMAL FISH MULTIPLE MYELOMA PANEL - RB1 deletion detected (11%).  Started on RVD, bortezomib, lenalidomide, and dexamethasone (VRd). Velcade 1.3mg/m2 SQ weekly x 3, 1 week off.  Revlimid 25mg/day, days 1-21 every 28 days. Dexamethasone 20mg weekly.  Started on 8/9/24.  Currently cycle 4, day 15.   7/12/24- M spike 2.1, IgG 2789, k/l ratio 64.66 12/17/24- IgG 763, k/l ratio 1.71   In [facility] - bone marrow biopsy 11/19/24- normocellular marrow with trilineage hematopoiesis, no evidence of plasma cell neoplasm.   Patient had an autologous stem cell transplant on 12/27/25.  Today is Day +127.  Thromboprophylaxis with aspirin daily (taking 325mg).   Antiviral prophylaxis with acyclovir 800mg twice a day.  PCP ppx with bactrim SS daily.  Zofran as needed for nausea. Bowel regimen for constipation.  Patient with mild back pain not requiring narcotics.   She was started on revlimid maintenance, likely etiology of her fatigue.  Continue for now- 10mg x 3 weeks, 1 week off.   Will continue aspirin daily with Revlimid maintenance Monitor hepatitis B core PCR monthly per transplant.  CBC today - WBC 3.6 Hg 13.3 Plt 178 CMP, immunoelectrophoresis drawn at today's visit- every 3 months for now.    The patient will follow up in 2 months.   [facility] Patient ID# [id number]

## 2025-07-11 NOTE — PHYSICAL EXAM
[Obese] : obese [Normal] : affect appropriate [de-identified] : anicteric [de-identified] : no c/c/e [de-identified] : no rashes